# Patient Record
Sex: MALE | Race: ASIAN | NOT HISPANIC OR LATINO | URBAN - METROPOLITAN AREA
[De-identification: names, ages, dates, MRNs, and addresses within clinical notes are randomized per-mention and may not be internally consistent; named-entity substitution may affect disease eponyms.]

---

## 2022-07-27 ENCOUNTER — INPATIENT (INPATIENT)
Facility: HOSPITAL | Age: 32
LOS: 4 days | Discharge: HOME | End: 2022-08-01
Attending: STUDENT IN AN ORGANIZED HEALTH CARE EDUCATION/TRAINING PROGRAM | Admitting: STUDENT IN AN ORGANIZED HEALTH CARE EDUCATION/TRAINING PROGRAM

## 2022-07-27 VITALS
RESPIRATION RATE: 18 BRPM | SYSTOLIC BLOOD PRESSURE: 125 MMHG | TEMPERATURE: 99 F | DIASTOLIC BLOOD PRESSURE: 75 MMHG | HEART RATE: 80 BPM | OXYGEN SATURATION: 99 %

## 2022-07-27 DIAGNOSIS — R05.9 COUGH, UNSPECIFIED: ICD-10-CM

## 2022-07-27 DIAGNOSIS — Z29.9 ENCOUNTER FOR PROPHYLACTIC MEASURES, UNSPECIFIED: ICD-10-CM

## 2022-07-27 DIAGNOSIS — L03.115 CELLULITIS OF RIGHT LOWER LIMB: ICD-10-CM

## 2022-07-27 DIAGNOSIS — L03.90 CELLULITIS, UNSPECIFIED: ICD-10-CM

## 2022-07-27 DIAGNOSIS — E87.1 HYPO-OSMOLALITY AND HYPONATREMIA: ICD-10-CM

## 2022-07-27 LAB
ALBUMIN SERPL ELPH-MCNC: 4 G/DL — SIGNIFICANT CHANGE UP (ref 3.3–5)
ALP SERPL-CCNC: 88 U/L — SIGNIFICANT CHANGE UP (ref 40–120)
ALT FLD-CCNC: 37 U/L — SIGNIFICANT CHANGE UP (ref 4–41)
ANION GAP SERPL CALC-SCNC: 17 MMOL/L — HIGH (ref 7–14)
AST SERPL-CCNC: 27 U/L — SIGNIFICANT CHANGE UP (ref 4–40)
B PERT DNA SPEC QL NAA+PROBE: SIGNIFICANT CHANGE UP
B PERT+PARAPERT DNA PNL SPEC NAA+PROBE: SIGNIFICANT CHANGE UP
BASOPHILS # BLD AUTO: 0.07 K/UL — SIGNIFICANT CHANGE UP (ref 0–0.2)
BASOPHILS NFR BLD AUTO: 0.4 % — SIGNIFICANT CHANGE UP (ref 0–2)
BILIRUB SERPL-MCNC: 0.4 MG/DL — SIGNIFICANT CHANGE UP (ref 0.2–1.2)
BLOOD GAS VENOUS COMPREHENSIVE RESULT: SIGNIFICANT CHANGE UP
BORDETELLA PARAPERTUSSIS (RAPRVP): SIGNIFICANT CHANGE UP
BUN SERPL-MCNC: 18 MG/DL — SIGNIFICANT CHANGE UP (ref 7–23)
C PNEUM DNA SPEC QL NAA+PROBE: SIGNIFICANT CHANGE UP
CALCIUM SERPL-MCNC: 9.5 MG/DL — SIGNIFICANT CHANGE UP (ref 8.4–10.5)
CHLORIDE SERPL-SCNC: 97 MMOL/L — LOW (ref 98–107)
CO2 SERPL-SCNC: 19 MMOL/L — LOW (ref 22–31)
CREAT SERPL-MCNC: 0.55 MG/DL — SIGNIFICANT CHANGE UP (ref 0.5–1.3)
EGFR: 137 ML/MIN/1.73M2 — SIGNIFICANT CHANGE UP
EOSINOPHIL # BLD AUTO: 0.22 K/UL — SIGNIFICANT CHANGE UP (ref 0–0.5)
EOSINOPHIL NFR BLD AUTO: 1.3 % — SIGNIFICANT CHANGE UP (ref 0–6)
FLUAV SUBTYP SPEC NAA+PROBE: SIGNIFICANT CHANGE UP
FLUBV RNA SPEC QL NAA+PROBE: SIGNIFICANT CHANGE UP
GLUCOSE SERPL-MCNC: 97 MG/DL — SIGNIFICANT CHANGE UP (ref 70–99)
HADV DNA SPEC QL NAA+PROBE: SIGNIFICANT CHANGE UP
HCOV 229E RNA SPEC QL NAA+PROBE: SIGNIFICANT CHANGE UP
HCOV HKU1 RNA SPEC QL NAA+PROBE: SIGNIFICANT CHANGE UP
HCOV NL63 RNA SPEC QL NAA+PROBE: SIGNIFICANT CHANGE UP
HCOV OC43 RNA SPEC QL NAA+PROBE: SIGNIFICANT CHANGE UP
HCT VFR BLD CALC: 41 % — SIGNIFICANT CHANGE UP (ref 39–50)
HGB BLD-MCNC: 13.3 G/DL — SIGNIFICANT CHANGE UP (ref 13–17)
HIV 1+2 AB+HIV1 P24 AG SERPL QL IA: SIGNIFICANT CHANGE UP
HMPV RNA SPEC QL NAA+PROBE: SIGNIFICANT CHANGE UP
HPIV1 RNA SPEC QL NAA+PROBE: SIGNIFICANT CHANGE UP
HPIV2 RNA SPEC QL NAA+PROBE: SIGNIFICANT CHANGE UP
HPIV3 RNA SPEC QL NAA+PROBE: SIGNIFICANT CHANGE UP
HPIV4 RNA SPEC QL NAA+PROBE: DETECTED
IANC: 12.12 K/UL — HIGH (ref 1.8–7.4)
IMM GRANULOCYTES NFR BLD AUTO: 0.5 % — SIGNIFICANT CHANGE UP (ref 0–1.5)
LYMPHOCYTES # BLD AUTO: 19.1 % — SIGNIFICANT CHANGE UP (ref 13–44)
LYMPHOCYTES # BLD AUTO: 3.24 K/UL — SIGNIFICANT CHANGE UP (ref 1–3.3)
M PNEUMO DNA SPEC QL NAA+PROBE: SIGNIFICANT CHANGE UP
MCHC RBC-ENTMCNC: 28.9 PG — SIGNIFICANT CHANGE UP (ref 27–34)
MCHC RBC-ENTMCNC: 32.4 GM/DL — SIGNIFICANT CHANGE UP (ref 32–36)
MCV RBC AUTO: 89.1 FL — SIGNIFICANT CHANGE UP (ref 80–100)
MONOCYTES # BLD AUTO: 1.18 K/UL — HIGH (ref 0–0.9)
MONOCYTES NFR BLD AUTO: 7 % — SIGNIFICANT CHANGE UP (ref 2–14)
NEUTROPHILS # BLD AUTO: 12.12 K/UL — HIGH (ref 1.8–7.4)
NEUTROPHILS NFR BLD AUTO: 71.7 % — SIGNIFICANT CHANGE UP (ref 43–77)
NRBC # BLD: 0 /100 WBCS — SIGNIFICANT CHANGE UP
NRBC # FLD: 0 K/UL — SIGNIFICANT CHANGE UP
PLATELET # BLD AUTO: 427 K/UL — HIGH (ref 150–400)
POTASSIUM SERPL-MCNC: 4.1 MMOL/L — SIGNIFICANT CHANGE UP (ref 3.5–5.3)
POTASSIUM SERPL-SCNC: 4.1 MMOL/L — SIGNIFICANT CHANGE UP (ref 3.5–5.3)
PROT SERPL-MCNC: 8.4 G/DL — HIGH (ref 6–8.3)
RAPID RVP RESULT: DETECTED
RBC # BLD: 4.6 M/UL — SIGNIFICANT CHANGE UP (ref 4.2–5.8)
RBC # FLD: 12.8 % — SIGNIFICANT CHANGE UP (ref 10.3–14.5)
RSV RNA SPEC QL NAA+PROBE: SIGNIFICANT CHANGE UP
RV+EV RNA SPEC QL NAA+PROBE: SIGNIFICANT CHANGE UP
SARS-COV-2 RNA SPEC QL NAA+PROBE: SIGNIFICANT CHANGE UP
SODIUM SERPL-SCNC: 133 MMOL/L — LOW (ref 135–145)
WBC # BLD: 16.92 K/UL — HIGH (ref 3.8–10.5)
WBC # FLD AUTO: 16.92 K/UL — HIGH (ref 3.8–10.5)

## 2022-07-27 PROCEDURE — 88305 TISSUE EXAM BY PATHOLOGIST: CPT | Mod: 26

## 2022-07-27 PROCEDURE — 99053 MED SERV 10PM-8AM 24 HR FAC: CPT

## 2022-07-27 PROCEDURE — 99223 1ST HOSP IP/OBS HIGH 75: CPT

## 2022-07-27 PROCEDURE — 99255 IP/OBS CONSLTJ NEW/EST HI 80: CPT

## 2022-07-27 PROCEDURE — 71045 X-RAY EXAM CHEST 1 VIEW: CPT | Mod: 26

## 2022-07-27 PROCEDURE — 88312 SPECIAL STAINS GROUP 1: CPT | Mod: 26

## 2022-07-27 PROCEDURE — 99285 EMERGENCY DEPT VISIT HI MDM: CPT

## 2022-07-27 RX ORDER — VANCOMYCIN HCL 1 G
750 VIAL (EA) INTRAVENOUS EVERY 12 HOURS
Refills: 0 | Status: DISCONTINUED | OUTPATIENT
Start: 2022-07-27 | End: 2022-07-27

## 2022-07-27 RX ORDER — ENOXAPARIN SODIUM 100 MG/ML
40 INJECTION SUBCUTANEOUS EVERY 24 HOURS
Refills: 0 | Status: DISCONTINUED | OUTPATIENT
Start: 2022-07-27 | End: 2022-08-01

## 2022-07-27 RX ORDER — SODIUM CHLORIDE 9 MG/ML
1000 INJECTION INTRAMUSCULAR; INTRAVENOUS; SUBCUTANEOUS
Refills: 0 | Status: DISCONTINUED | OUTPATIENT
Start: 2022-07-27 | End: 2022-07-30

## 2022-07-27 RX ORDER — LIDOCAINE 4 G/100G
1 CREAM TOPICAL ONCE
Refills: 0 | Status: COMPLETED | OUTPATIENT
Start: 2022-07-27 | End: 2022-07-27

## 2022-07-27 RX ORDER — ACETAMINOPHEN 500 MG
650 TABLET ORAL EVERY 6 HOURS
Refills: 0 | Status: DISCONTINUED | OUTPATIENT
Start: 2022-07-27 | End: 2022-08-01

## 2022-07-27 RX ADMIN — SODIUM CHLORIDE 100 MILLILITER(S): 9 INJECTION INTRAMUSCULAR; INTRAVENOUS; SUBCUTANEOUS at 11:55

## 2022-07-27 RX ADMIN — LIDOCAINE 1 APPLICATION(S): 4 CREAM TOPICAL at 05:40

## 2022-07-27 RX ADMIN — Medication 100 MILLIGRAM(S): at 05:40

## 2022-07-27 RX ADMIN — Medication 100 MILLIGRAM(S): at 19:37

## 2022-07-27 NOTE — ED PROVIDER NOTE - CLINICAL SUMMARY MEDICAL DECISION MAKING FREE TEXT BOX
33 Y/O M denies PMH or PSH currently undomiciled C/O painful rash to the right arm and both ankles for the past month. Pt denies specific trigger. Pt also states he has been coughing for the past month. Plan is CXR and quantiferion to eval for TB, labs to eval for anemia or electrolyte disturbance. Pt is undomiciled and would be unable to  any prescription and has no ability to follow up.

## 2022-07-27 NOTE — ED ADULT TRIAGE NOTE - CHIEF COMPLAINT QUOTE
rash- r/o monkeypox    pt is undomiciled.. has a rash to lower legs, feet, arms for 1 month.  denies drugs or etoh rash- r/o monkeypox    pt is undomiciled.. has a rash to lower legs, feet, arms for 1 month.  has cough.  denies drugs or etoh

## 2022-07-27 NOTE — H&P ADULT - HISTORY OF PRESENT ILLNESS
Patient is a 33 y/o M with no PMH/PSH, currently undomiciled presenting to the ED with rash that has been present for approximately 1 month. Patient reports that he noted small itchy "bites" on his R arm and b/l ankles about 1 month ago and states that these lesions did not improve and slowly grew over time. He is concerned that they aren't healing and came in to the hospital to be evaluated. He also endorses a cough that he has had for several days but denies any sputum production or fever/runny nose. He denies any other infectious symptoms such as dysuria, diarrhea or sore throat. He denies any dysphagia or odynophagia. Denies any history of STDs. Patient denies any recent travel and states that he has always lived in [place]. Denies any sick contacts. Patient denies any recent sexual contact but refuses to elaborate much further.    In the ED patient was given 1 dose of PO doxycycline. Patient is a 31 y/o M with no PMH/PSH, currently undomiciled presenting to the ED with rash that has been present for approximately 1 month. Patient reports that he noted small itchy "bites" on his R arm and b/l ankles about 1 month ago and states that these lesions did not improve and slowly grew over time. He also notes that he was able to express some pus and fluid from these lesions. He is concerned that they aren't healing and came in to the hospital to be evaluated. He also endorses a cough that he has had for several days but denies any sputum production or fever/runny nose. Patient denies any genital lesions. He denies any other infectious symptoms such as dysuria, diarrhea or sore throat. He denies any dysphagia or odynophagia. Denies any history of STDs. Patient denies any recent travel and states that he has always lived in [place]. Denies any sick contacts. Patient denies any recent sexual contact but refuses to elaborate much further.    In the ED patient was given 1 dose of PO doxycycline.

## 2022-07-27 NOTE — CONSULT NOTE ADULT - SUBJECTIVE AND OBJECTIVE BOX
HPI: Patient is a 33 y/o M with no PMH/PSH, currently undomiciled presenting to the ED with rash that has been present for approximately 1 month. Patient reports that he noted small itchy "bites" on his R arm and b/l ankles about 1 month ago and states that these lesions did not improve and slowly grew over time. He also notes that he was able to express some pus and fluid from these lesions. He is concerned that they aren't healing and came in to the hospital to be evaluated. He also endorses a cough that he has had for several days but denies any sputum production or fever/runny nose. Patient denies any genital lesions. He denies any other infectious symptoms such as dysuria, diarrhea or sore throat. He denies any dysphagia or odynophagia. Denies any history of STDs. Patient denies any recent travel and states that he has always lived in [place]. Denies any sick contacts. Patient denies any recent sexual contact but refuses to elaborate much further.    In the ED patient was given 1 dose of PO doxycycline. (27 Jul 2022 09:57)      PAST MEDICAL & SURGICAL HISTORY:  No pertinent past medical history  No significant past surgical history    REVIEW OF SYSTEMS:  General: no fevers/chills; no lethargy  Skin/Breast: see HPI  Ophthalmologic: no eye pain or changes in vision  ENT: no dysphagia or changes in hearing  Respiratory: cough+ no SOB  Cardiovascular: no palpitations or chest pain  Gastrointestinal: no abdominal pain or blood in stool  Genitourinary: no dysuria or frequency  Musculoskeletal: no joint pains or weakness  Neurological: no weakness, numbness, or tingling    MEDICATIONS  (STANDING):  doxycycline monohydrate Capsule 100 milliGRAM(s) Oral every 12 hours  enoxaparin Injectable 40 milliGRAM(s) SubCutaneous every 24 hours  sodium chloride 0.9%. 1000 milliLiter(s) (100 mL/Hr) IV Continuous <Continuous>    MEDICATIONS  (PRN):  acetaminophen     Tablet .. 650 milliGRAM(s) Oral every 6 hours PRN Temp greater or equal to 38C (100.4F), Mild Pain (1 - 3)    Allergies: No Known Allergies    SOCIAL HISTORY:   Denies tobacco use  Denies illicit drug use  Denies EtOH use  Reports has been living "on the street" for several years now. States he is originally from [place] and [place] in the [place].  Denies any current employment. Declining to answer any questions about sexual contacts.    FAMILY HISTORY: No pertinent first degree FH.    Vital Signs Last 24 Hrs  T(C): 36.6 (27 Jul 2022 12:14), Max: 37.3 (27 Jul 2022 03:15)  T(F): 97.9 (27 Jul 2022 12:14), Max: 99.1 (27 Jul 2022 03:15)  HR: 84 (27 Jul 2022 12:53) (73 - 84)  BP: 109/75 (27 Jul 2022 12:53) (103/55 - 125/75)  BP(mean): --  RR: 17 (27 Jul 2022 12:53) (16 - 18)  SpO2: 99% (27 Jul 2022 12:53) (99% - 100%)    Parameters below as of 27 Jul 2022 12:53  Patient On (Oxygen Delivery Method): room air    PHYSICAL EXAM:  The patient was AOx3, well nourished, and in NAD.  OP showed no ulcerations.  There was no visible LAD.  Conjunctiva were non-injected.  There was no clubbing or edema of extremities.  The scalp, hair, face, eyebrows, lips, OP, neck, chest, back, extremities x4, and nails were examined.  There was no hyperhidrosis or bromhidrosis.    Of note on skin exam:    LABS:                        13.3   16.92 )-----------( 427      ( 27 Jul 2022 05:40 )             41.0     07-27    133<L>  |  97<L>  |  18  ----------------------------<  97  4.1   |  19<L>  |  0.55    Ca    9.5      27 Jul 2022 05:40    TPro  8.4<H>  /  Alb  4.0  /  TBili  0.4  /  DBili  x   /  AST  27  /  ALT  37  /  AlkPhos  88  07-27      RADIOLOGY & ADDITIONAL STUDIES:  ACC: [id number] EXAM:  XR CHEST PORTABLE URGENT 1V                        PROCEDURE DATE:  07/27/2022    IMPRESSION:  No acute pulmonary disease. HPI: Patient is a 33 y/o M with no PMH/PSH, currently undomiciled presenting to the ED with rash that has been present for approximately 1 month. Patient reports that he noted small itchy "bites" on his R arm and b/l ankles about 1 month ago and states that these lesions did not improve and slowly grew over time. He also notes that he was able to express some pus and fluid from these lesions. He is concerned that they aren't healing and came in to the hospital to be evaluated. He also endorses a cough that he has had for several days but denies any sputum production or fever/runny nose. Patient denies any genital lesions. He denies any other infectious symptoms such as dysuria, diarrhea or sore throat. He denies any dysphagia or odynophagia. Denies any history of STDs. Patient denies any recent travel and states that he has always lived in [place]. Denies any sick contacts. Patient denies any recent sexual contact but refuses to elaborate much further.    ED course: patient was given vancomycin and doxycycline 100mg q12h PO, ID stopped vancomycin. Pt was found to be parainfluenza+.    Derm hx: 31yo with no PMH undomiciled with 1 month long itchy rash on right arm and b/l ankles, that started after getting bug bites and then scratching while swimming in [facility]. Rash started as irritation lesions, then ulcerated and became purulent. ID has sent workup for: monkeypox (PCR), Bartonella, HIV, syphilis, RMSF, BCXs x 2    PAST MEDICAL & SURGICAL HISTORY:  No pertinent past medical history  No significant past surgical history    REVIEW OF SYSTEMS:  General: no fevers/chills; no lethargy  Skin/Breast: see HPI  Ophthalmologic: no eye pain or changes in vision  ENT: no dysphagia or changes in hearing  Respiratory: cough+ no SOB  Cardiovascular: no palpitations or chest pain  Gastrointestinal: no abdominal pain or blood in stool  Genitourinary: no dysuria or frequency  Musculoskeletal: no joint pains or weakness  Neurological: no weakness, numbness, or tingling    MEDICATIONS  (STANDING):  doxycycline monohydrate Capsule 100 milliGRAM(s) Oral every 12 hours  enoxaparin Injectable 40 milliGRAM(s) SubCutaneous every 24 hours  sodium chloride 0.9%. 1000 milliLiter(s) (100 mL/Hr) IV Continuous <Continuous>    MEDICATIONS  (PRN):  acetaminophen     Tablet .. 650 milliGRAM(s) Oral every 6 hours PRN Temp greater or equal to 38C (100.4F), Mild Pain (1 - 3)    Allergies: No Known Allergies    SOCIAL HISTORY:   Denies tobacco use  Denies illicit drug use  Denies EtOH use  Reports has been living "on the street" for several years now. States he is originally from [place] and has been in the [place] for [place].  Denies any current employment. Declining to answer any questions about sexual contacts.    FAMILY HISTORY: No pertinent first degree FH.    Vital Signs Last 24 Hrs  T(C): 36.6 (27 Jul 2022 12:14), Max: 37.3 (27 Jul 2022 03:15)  T(F): 97.9 (27 Jul 2022 12:14), Max: 99.1 (27 Jul 2022 03:15)  HR: 84 (27 Jul 2022 12:53) (73 - 84)  BP: 109/75 (27 Jul 2022 12:53) (103/55 - 125/75)  BP(mean): --  RR: 17 (27 Jul 2022 12:53) (16 - 18)  SpO2: 99% (27 Jul 2022 12:53) (99% - 100%)    Parameters below as of 27 Jul 2022 12:53  Patient On (Oxygen Delivery Method): room air    PHYSICAL EXAM:  The patient was AOx3, well nourished, and in NAD.  OP showed no ulcerations.  There was no visible LAD.  Conjunctiva were non-injected.  There was no clubbing or edema of extremities.  The scalp, hair, face, eyebrows, lips, OP, neck, chest, back, extremities x4, and nails were examined.  There was no hyperhidrosis or bromhidrosis.    Of note on skin exam:  - purulent erythematous nodule on right distal arm   - erythematous plaques and ulcers with hemorrhagic and yellow crust scattered on right distal arm and b/l ankles    LABS:                        13.3   16.92 )-----------( 427      ( 27 Jul 2022 05:40 )             41.0     07-27    133<L>  |  97<L>  |  18  ----------------------------<  97  4.1   |  19<L>  |  0.55    Ca    9.5      27 Jul 2022 05:40    TPro  8.4<H>  /  Alb  4.0  /  TBili  0.4  /  DBili  x   /  AST  27  /  ALT  37  /  AlkPhos  88  07-27      RADIOLOGY & ADDITIONAL STUDIES:  ACC: [id number] EXAM:  XR CHEST PORTABLE URGENT 1V                        PROCEDURE DATE:  07/27/2022    IMPRESSION:  No acute pulmonary disease.

## 2022-07-27 NOTE — PATIENT PROFILE ADULT - FALL HARM RISK - HARM RISK INTERVENTIONS
Assistance with ambulation/Assistance OOB with selected safe patient handling equipment/Communicate Risk of Fall with Harm to all staff/Discuss with provider need for PT consult/Monitor gait and stability/Reinforce activity limits and safety measures with patient and family/Tailored Fall Risk Interventions/Use of alarms - bed, chair and/or voice tab/Visual Cue: Yellow wristband and red socks/Bed in lowest position, wheels locked, appropriate side rails in place/Call bell, personal items and telephone in reach/Instruct patient to call for assistance before getting out of bed or chair/Non-slip footwear when patient is out of bed/[place] to call system/Physically safe environment - no spills, clutter or unnecessary equipment/Purposeful Proactive Rounding/Room/bathroom lighting operational, light cord in reach

## 2022-07-27 NOTE — H&P ADULT - NSHPPHYSICALEXAM_GEN_ALL_CORE
GENERAL: NAD, lying in bed comfortably  HEAD: Normocephalic, atraumatic  EYES: EOMI, PERRL  ENT: no lesions noted within oral mucosa, moist mucus membranes, small crusted lesion noted over left vermillion border  NECK: Supple, No JVD, no palpable lymphadenopathy  CHEST/LUNG: CTAB, no increased WOB  HEART: RRR, no m/r/g  ABDOMEN: soft, NT, ND, BS+  EXTREMITIES:  2+ peripheral pulses, no clubbing, no edema  NERVOUS SYSTEM:  A&Ox3, no focal deficits  MSK: FROM all 4 extremities, full and equal strength  SKIN: R arm with crusted over lesion (approximately 3cm) with scant purulent drainage, b/l ankles lesions noted, multiple crusted over lesions with smaller papules, lesions noted to be slightly tender to palpation GENERAL: NAD, lying in bed comfortably  HEAD: Normocephalic, atraumatic  EYES: EOMI, PERRL  ENT: no lesions noted within oral mucosa, moist mucus membranes, small crusted lesion noted over left vermillion border  NECK: Supple, No JVD, no palpable lymphadenopathy  CHEST/LUNG: CTAB, no increased WOB  HEART: RRR, no m/r/g  ABDOMEN: soft, NT, ND, BS+  /Rectal: Patient refusing  EXTREMITIES:  2+ peripheral pulses, no clubbing, no edema  NERVOUS SYSTEM:  A&Ox3, no focal deficits  MSK: FROM all 4 extremities, full and equal strength  SKIN: R arm with crusted over lesion (approximately 3cm) with scant purulent drainage, b/l ankles lesions noted, multiple crusted over lesions with smaller papules, lesions noted to be slightly tender to palpation

## 2022-07-27 NOTE — CONSULT NOTE ADULT - ASSESSMENT
33 yo M no prior PMH, homeless, MSW, initially with rash  Leukocytosis, no fevers  Rash starting one month ago on ankles, feet, lower arms  Rash started as irritation lesions, then ulcerated and became purulent  CXR clear  No typical risk factors for Monkeypox  Patient is homeless  Uncertain underlying etiology--consider rickettsial such as rickettsialpox vs monkey pox vs other bites/lesions related to insects (in setting of homelessness)  Overall,  1) Rash  - Unclear etiology, could be monkeypox but does not seem to have risk factors; consider insect related process in setting of homelessness  - Doxycyline 100mg q 12  - DC Vanco  - Check Bartonella, HIV, syphilis, RMSF, BCXs x 2  - F/U Monkeypox swab  - Would consult Dermatology  2) Leukocytosis  -  Trend to normal  - BCXs as above  3) Parainfluenza  - Supportive care    Zac Cast MD  Contact on TEAMS messaging from 9am - 5pm  From 5pm-9am, on weekends, or if no response call [phone]

## 2022-07-27 NOTE — ED PROVIDER NOTE - NS ED ATTENDING STATEMENT MOD
This was a shared visit with the VIJAY. I reviewed and verified the documentation and independently performed the documented:

## 2022-07-27 NOTE — PATIENT PROFILE ADULT - ARRIVAL FROM
Returned patients call unable to leave message, mailbox full for patient at      Telephone Information:   Mobile [phone]    to schedule Consult.    Unable to leave message mailbox full   Pt is homeless and came from the city.

## 2022-07-27 NOTE — CONSULT NOTE ADULT - ATTENDING COMMENTS
Pustular eruption noted on the extremities with a hx of recent fresh water exposure. Differential includes bacterial infection (staph, atypical mycobacterial infections, other) vs. monkeypox vs. other. Performed 2 biopsies, H&E and tissue cultures to assess for infection.    Adam Kramer MD, PharmD, MPH  Co-Director, Inpatient Dermatology Consultation Service, [facility]

## 2022-07-27 NOTE — H&P ADULT - PROBLEM SELECTOR PROBLEM 4
Need for prophylactic measure Plastic Surgeon Procedure Text (A): After obtaining clear surgical margins the patient was sent to plastics for surgical repair.  The patient understands they will receive post-surgical care and follow-up from the referring physician's office.

## 2022-07-27 NOTE — CONSULT NOTE ADULT - ASSESSMENT
ASSESSMENT/PLAN:      Recommendations were communicated with the primary team (Dr Mrace Cardoza)  The patient's chart was reviewed in addition to being examined at bedside with the dermatology attending. Discussed plans with the dermatology attending, Dr. Kramer  Dermatology will continue to follow. Thank you for consulting our service.    Vickie John MD MPH  Resident Physician, PGY3  [facility] Dermatology  Office: [phone]  Pager: [phone]  **Please page with 10-DIGIT callback # for further related questions.** 31yo with no PMH undomiciled with 1 month long itchy rash on right arm and b/l ankles, that started after getting bug bites and then scratching while swimming in [facility]    ASSESSMENT/PLAN:  #Favor infectious process. Given hx of swimming in body of water, favor mycobacterial infection. 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 It is found in both fresh- and saltwater environments. Minor trauma is a predisposing factor. Other infections such as sporotrichosis or leischmaniasis are less likely due to lack of exposure and travel.  - Appreciate ID work-up and recommendations  - Follow bacterial culture taken by dermatology team of left calf on 07/27/22  - Biopsy performed on left leg on 07/27/22 for H&E x 1 and Tissue Culture x 1. Procedure as below    ---  Dermatology Punch Biopsy Procedure Note    After risks and benefits of procedure including bleeding, infection and scar were reviewed (consents including photo consent reviewed, signed and in chart), allergies were reviewed and time out performed. Written consent given by the patient.    Area cleaned with rubbing alcohol and anesthetized with lidocaine and epinephrine.  #2, 4mm punch biopsies were performed to left leg, hemostasis achieved with surgifoarm and pressure dressing placed. Wound care reviewed with patient and team. Biopsy site should remain covered with pressure bandage for 24-48 hours. Then apply Vaseline to biopsy site daily and cover with bandage until healed.  ---    Recommendations were communicated with the primary team (Dr Marce Cardoza)  The patient's chart was reviewed in addition to being examined at bedside with the dermatology attending. Discussed plans with the dermatology attending, Dr. Kramer  Dermatology will continue to follow. Thank you for consulting our service.    Vickie John MD MPH  Resident Physician, PGY3  [facility] Dermatology  Office: [phone]  Pager: [phone]  **Please page with 10-DIGIT callback # for further related questions.**

## 2022-07-27 NOTE — ED ADULT NURSE REASSESSMENT NOTE - NS ED NURSE REASSESS COMMENT FT1
Pt a&ox4, NAD, Respirations are even and unlabored, no signs of respiratory distress.
Received pt at change of shift, PT is resting in stretcher, easily arousable to verbal stimuli, A+Ox4. pt arrives for rash to his right wrist, and bilateral lower extremities. redness, swelling and purulent drainage noted to areas. no other medical complaints at this time. Respirations even and unlabored, normal work of breathing, no accessory muscle use, speaking in full clear uninterrupted sentences. Pt denies any chest pain, SOB, headache, dizziness, N/V/D, fever, chills. 22G to Left forearm, no redness or swelling noted. will continue to monitor.

## 2022-07-27 NOTE — H&P ADULT - PROBLEM SELECTOR PLAN 2
Patient with nonproductive cough for several days  No crackles or stridor on lung exam  CXR without any focal consolidations  Quantiferon gold sent, currently pending  RVP Positive for Parainfluenza  C/W supportive care and symptom control

## 2022-07-27 NOTE — ED PROVIDER NOTE - PRINCIPAL DIAGNOSIS
Erick Schafer is calling to request a refill on the following medication(s):    Medication Request:  Requested Prescriptions     Pending Prescriptions Disp Refills    ondansetron (ZOFRAN) 4 MG tablet 20 tablet 3     Sig: TAKE ONE TABLET BY MOUTH EVERY 6 TO 8 HOURS AS NEEDED FOR NAUSEA AND VOMITING    meclizine (ANTIVERT) 25 MG tablet 90 tablet 0     Sig: Take 1 tablet by mouth 3 times daily as needed for Dizziness       Last Visit Date (If Applicable):  0/70/8340    Next Visit Date:    12/15/2021
Cellulitis of right leg

## 2022-07-27 NOTE — ED PROVIDER NOTE - PHYSICAL EXAMINATION
Ulceration to R ankle, L ankle and R hand. R ankle with surrounding erythema and discharge, no crepitus.

## 2022-07-27 NOTE — H&P ADULT - NSHPSOCIALHISTORY_GEN_ALL_CORE
Denies tobacco use  Denies illicit drug use  Denies EtOH use  Reports has been living "on the street" for several years now. States he is originally from [facility] and [place] in the [place].  Denies any current employment. Declining to answer any questions about sexual contacts.

## 2022-07-27 NOTE — H&P ADULT - PROBLEM SELECTOR PLAN 3
Patient noted with hyponatremia to 133  Also with slightly elevated lactate to 2.1  Most likely hypovolemic hyponatremia in setting of ongoing infection  Will give gentle IV hydration, NS @ 100cc/hr for 10 hours  Encourage PO intake

## 2022-07-27 NOTE — PATIENT PROFILE ADULT - FUNCTIONAL ASSESSMENT - BASIC MOBILITY 6.
3-calculated by average/Not able to assess (calculate score using [facility] averaging method)

## 2022-07-27 NOTE — ED ADULT NURSE NOTE - OBJECTIVE STATEMENT
33 y/o male, a&ox4, received to rm 20 for rash. Pt reports rash located to the right wrist, and left lower extremity for the past month. Endorses itchiness, skin abrasions and scabs noted to extremities. Pt also endorses a cough, pt is currently homeless and lives in a shelter. Denies PMH. 22G IV placed to left AC labs collected and sent off. Pt medicated as per MD orders. Respirations are even and unlabored, no signs of respiratory distress.

## 2022-07-27 NOTE — CONSULT NOTE ADULT - SUBJECTIVE AND OBJECTIVE BOX
"HPI:  Patient is a 33 y/o M with no PMH/PSH, currently undomiciled presenting to the ED with rash that has been present for approximately 1 month. Patient reports that he noted small itchy "bites" on his R arm and b/l ankles about 1 month ago and states that these lesions did not improve and slowly grew over time. He also notes that he was able to express some pus and fluid from these lesions. He is concerned that they aren't healing and came in to the hospital to be evaluated. He also endorses a cough that he has had for several days but denies any sputum production or fever/runny nose. Patient denies any genital lesions. He denies any other infectious symptoms such as dysuria, diarrhea or sore throat. He denies any dysphagia or odynophagia. Denies any history of STDs. Patient denies any recent travel and states that he has always lived in [place]. Denies any sick contacts. Patient denies any recent sexual contact but refuses to elaborate much further.    In the ED patient was given 1 dose of PO doxycycline. (27 Jul 2022 09:57)"    Above reviewed. 31 yo M no prior PMH, homeless, MSW, initially with rash  Patient states starting one month ago developed rash around feet and ankles, as well as lower arm  Increasing in size, and ulcerating  States was swimming in [facility] and then lesions started  No sexual contact recently, never MSM  No other travel, originally from [facility]  ID called for further eval    PAST MEDICAL & SURGICAL HISTORY:  No pertinent past medical history      No significant past surgical history    Allergies    No Known Allergies    Intolerances    ANTIMICROBIALS:  vancomycin  IVPB 750 every 12 hours    OTHER MEDS:  acetaminophen     Tablet .. 650 milliGRAM(s) Oral every 6 hours PRN  enoxaparin Injectable 40 milliGRAM(s) SubCutaneous every 24 hours  sodium chloride 0.9%. 1000 milliLiter(s) IV Continuous <Continuous>    SOCIAL HISTORY: No tobacco, no alcohol, no illicit drugs    FAMILY HISTORY: No pertinent family history relating to chief complaint    Drug Dosing Weight    Weight (kg): 75.2 (27 Jul 2022 10:46)    PE:    Vital Signs Last 24 Hrs  T(C): 36.8 (27 Jul 2022 08:39), Max: 37.3 (27 Jul 2022 03:15)  T(F): 98.2 (27 Jul 2022 08:39), Max: 99.1 (27 Jul 2022 03:15)  HR: 73 (27 Jul 2022 08:39) (73 - 80)  BP: 103/55 (27 Jul 2022 08:39) (103/55 - 125/75)  RR: 18 (27 Jul 2022 08:39) (16 - 18)  SpO2: 100% (27 Jul 2022 08:39) (99% - 100%)    Gen: AOx3, NAD, non-toxic, pleasant  CV: S1+S2 normal, nontachycardic  Resp: Clear bilat, no resp distress, no crackles/wheezes  Abd: Soft, nontender, +BS  Ext: No LE edema, no wounds  : No Raymnudo  IV/Skin: No thrombophlebitis  Msk: No low back pain, no arthralgias, no joint swelling  Neuro: No sensory deficits, no motor deficits    LABS:                        13.3   16.92 )-----------( 427      ( 27 Jul 2022 05:40 )             41.0     07-27    133<L>  |  97<L>  |  18  ----------------------------<  97  4.1   |  19<L>  |  0.55    Ca    9.5      27 Jul 2022 05:40    TPro  8.4<H>  /  Alb  4.0  /  TBili  0.4  /  DBili  x   /  AST  27  /  ALT  37  /  AlkPhos  88  07-27    MICROBIOLOGY:      Rapid RVP Result: Detected (07-27 @ 07:10) Parainfluenza    RADIOLOGY:    CXR 7/27:    FINDINGS:  Both lungs are equally aerated and free of any focal abnormalities. The   heart is not enlarged and there is no effusion.        COMPARISON:  None Available        IMPRESSION:  No acute pulmonary disease.

## 2022-07-27 NOTE — ED PROVIDER NOTE - OBJECTIVE STATEMENT
31 Y/O M denies PMH or PSH currently undomiciled C/O painful rash to the right arm and both ankles for the past month. Pt denies specific trigger. Pt also states he has been coughing for the past month. Pt denies SOB, denies any other sx or acute complaints.

## 2022-07-27 NOTE — H&P ADULT - ASSESSMENT
31 y/o M with no PMH/PSH, currently undomiciled currently presenting with complaint of rash for approximately 1 month. Etiology of rash unclear, however likely cellulitis vs possible monkeypox with evidence of systemic infection.

## 2022-07-27 NOTE — ED PROVIDER NOTE - ATTENDING APP SHARED VISIT CONTRIBUTION OF CARE
I performed a face-to-face evaluation of the patient and performed a history and physical examination along with the resident or ACP, and/or medical student above.  I agree with the history and physical examination as documented by the resident or ACP, and/or medical student above.  Gracia:   33yo M, denies pmh/PSxh, undomiciled, c/o painful rash to right arm and b/l ankles x approx 1 month. On ROS, also reporting cough but denies sob or cp. Denies f/c. Social issues as well to prevent pt from filling prescription/taking abx for cellulitis. Labs, imaging, meds, tba.

## 2022-07-27 NOTE — H&P ADULT - NSHPLABSRESULTS_GEN_ALL_CORE
LABS:                         13.3   16.92 )-----------( 427      ( 27 Jul 2022 05:40 )             41.0     07-27    133<L>  |  97<L>  |  18  ----------------------------<  97  4.1   |  19<L>  |  0.55    Ca    9.5      27 Jul 2022 05:40    TPro  8.4<H>  /  Alb  4.0  /  TBili  0.4  /  DBili  x   /  AST  27  /  ALT  37  /  AlkPhos  88  07-27    RADIOLOGY, EKG & ADDITIONAL TESTS: Reviewed.

## 2022-07-27 NOTE — ED ADULT NURSE NOTE - CHIEF COMPLAINT QUOTE
rash- r/o monkeypox    pt is undomiciled.. has a rash to lower legs, feet, arms for 1 month.  has cough.  denies drugs or etoh

## 2022-07-27 NOTE — H&P ADULT - PROBLEM SELECTOR PLAN 1
Patient with multiple crusted skin lesions noted with purulence and tenderness  Also with elevated WBC count, however without any fevers or swelling  Patient without recent exposure to healthcare settings and does not have any crepitus  Will treat empirically with cefazolin  Possible concern for monkeypox due to lesion distribution. Patient also poor historian with unclear sexual history and refusing genital exam  c/w droplet/contact precautions  ID consult called, f/u recs  Cont to trend CBCs Patient with multiple crusted skin lesions noted with purulence and tenderness  Also with elevated WBC count, however without any fevers or swelling  Patient without recent exposure to healthcare settings and does not have any crepitus  However patient reported history of pus drainage and some purulence noted on exam  Will treat empirically with vancomycin to cover for possible MRSA  Possible concern for monkeypox due to lesion distribution. Patient also poor historian with unclear sexual history and refusing genital exam  c/w droplet/contact precautions  ID consult called, f/u recs  Cont to trend CBCs

## 2022-07-28 LAB
ANION GAP SERPL CALC-SCNC: 14 MMOL/L — SIGNIFICANT CHANGE UP (ref 7–14)
BUN SERPL-MCNC: 11 MG/DL — SIGNIFICANT CHANGE UP (ref 7–23)
CALCIUM SERPL-MCNC: 9.5 MG/DL — SIGNIFICANT CHANGE UP (ref 8.4–10.5)
CHLORIDE SERPL-SCNC: 98 MMOL/L — SIGNIFICANT CHANGE UP (ref 98–107)
CO2 SERPL-SCNC: 25 MMOL/L — SIGNIFICANT CHANGE UP (ref 22–31)
CREAT SERPL-MCNC: 0.62 MG/DL — SIGNIFICANT CHANGE UP (ref 0.5–1.3)
EGFR: 132 ML/MIN/1.73M2 — SIGNIFICANT CHANGE UP
GLUCOSE SERPL-MCNC: 140 MG/DL — HIGH (ref 70–99)
GRAM STN FLD: SIGNIFICANT CHANGE UP
HCT VFR BLD CALC: 38 % — LOW (ref 39–50)
HGB BLD-MCNC: 13.1 G/DL — SIGNIFICANT CHANGE UP (ref 13–17)
MAGNESIUM SERPL-MCNC: 2.1 MG/DL — SIGNIFICANT CHANGE UP (ref 1.6–2.6)
MCHC RBC-ENTMCNC: 29.6 PG — SIGNIFICANT CHANGE UP (ref 27–34)
MCHC RBC-ENTMCNC: 34.5 GM/DL — SIGNIFICANT CHANGE UP (ref 32–36)
MCV RBC AUTO: 85.8 FL — SIGNIFICANT CHANGE UP (ref 80–100)
NIGHT BLUE STAIN TISS: SIGNIFICANT CHANGE UP
NRBC # BLD: 0 /100 WBCS — SIGNIFICANT CHANGE UP
NRBC # FLD: 0 K/UL — SIGNIFICANT CHANGE UP
PHOSPHATE SERPL-MCNC: 3.7 MG/DL — SIGNIFICANT CHANGE UP (ref 2.5–4.5)
PLATELET # BLD AUTO: 448 K/UL — HIGH (ref 150–400)
POTASSIUM SERPL-MCNC: 3.3 MMOL/L — LOW (ref 3.5–5.3)
POTASSIUM SERPL-SCNC: 3.3 MMOL/L — LOW (ref 3.5–5.3)
RBC # BLD: 4.43 M/UL — SIGNIFICANT CHANGE UP (ref 4.2–5.8)
RBC # FLD: 12.9 % — SIGNIFICANT CHANGE UP (ref 10.3–14.5)
SODIUM SERPL-SCNC: 137 MMOL/L — SIGNIFICANT CHANGE UP (ref 135–145)
SPECIMEN SOURCE: SIGNIFICANT CHANGE UP
SPECIMEN SOURCE: SIGNIFICANT CHANGE UP
T PALLIDUM AB TITR SER: NEGATIVE — SIGNIFICANT CHANGE UP
WBC # BLD: 20.49 K/UL — HIGH (ref 3.8–10.5)
WBC # FLD AUTO: 20.49 K/UL — HIGH (ref 3.8–10.5)

## 2022-07-28 PROCEDURE — 99233 SBSQ HOSP IP/OBS HIGH 50: CPT

## 2022-07-28 PROCEDURE — 99232 SBSQ HOSP IP/OBS MODERATE 35: CPT

## 2022-07-28 RX ADMIN — Medication 100 MILLIGRAM(S): at 05:33

## 2022-07-28 RX ADMIN — Medication 100 MILLIGRAM(S): at 18:25

## 2022-07-28 NOTE — DISCHARGE NOTE PROVIDER - HOSPITAL COURSE
33 y/o M with no PMH/PSH, currently undomiciled currently presenting with complaint of rash for approximately 1 month. Etiology of rash unclear, however likely cellulitis vs possible monkeypox with evidence of systemic infection. Patient with multiple crusted skin lesions noted with purulence and tenderness  Also with elevated WBC count, however without any fevers or swelling. Patient also poor historian with unclear sexual history and refusing genital exam  ID consulted trated with Doxy, derm s/p Punch Bx    Cough-RVP Positive for Parainfluenza, supportive care and symptom control.  Hyponatremia, Most likely hypovolemic hyponatremia in setting of ongoing infection, treated w/ IVF    on 7/28 pt is  medically stable for dc 33 y/o M with no PMH/PSH, currently undomiciled currently presenting with complaint of rash for approximately 1 month. Etiology of rash unclear, however likely cellulitis vs possible monkeypox with evidence of systemic infection. Patient with multiple crusted skin lesions noted with purulence and tenderness    multiple lesions on R and L leg  - Infectious Disease consulted, recommended cefazolin  - monkey pox and RMSF, bartonella, syphillis neg  - quant gold +, neg CXR, pt will need outpt follow up for treatement of latent TB   - Biopsy with strep pyogens  - will need outpt f/u for AFB cx of lesion as poss non MTB cause of lesion    Cough.   - Patient with nonproductive cough for several days--improving  - No crackles or stridor on lung exam  - Quantiferon gold +, CXR neg, will need outpt f/u for eval for latent TB treatment  - RVP Positive for Parainfluenza  - C/W supportive care and symptom control  - non toxic appearing.    Hyponatremia,   - Most likely hypovolemic hyponatremia in setting of ongoing infection, treated w/ IVF  - Pt to follow up with PCP as outpatient for monitoring     Need for prophylactic measure.   - DVT: Lovenox  - Diet: Regular       On_________, discussed with __________, patient is medically cleared and optimized for discharge today. All medications were reviewed with attending, and sent to mutually agreed upon pharmacy.   33 y/o M with no PMH/PSH, currently undomiciled currently presenting with complaint of rash for approximately 1 month. Etiology of rash unclear, however likely cellulitis vs possible monkeypox with evidence of systemic infection. Patient with multiple crusted skin lesions noted with purulence and tenderness    multiple lesions on R and L leg  - Infectious Disease consulted, recommended cefazolin  - monkey pox and RMSF, bartonella, syphillis neg  - quant gold +, neg CXR, pt will need outpt follow up for treatement of latent TB   - Biopsy with strep pyogens  - will need outpt f/u for AFB cx of lesion as poss non MTB cause of lesion  - Pt to be discharged on keflex     Cough.   - Patient with nonproductive cough for several days--improving  - No crackles or stridor on lung exam  - Quantiferon gold +, CXR neg, will need outpt f/u for eval for latent TB treatment  - RVP Positive for Parainfluenza  - C/W supportive care and symptom control  - non toxic appearing.    Hyponatremia,   - Most likely hypovolemic hyponatremia in setting of ongoing infection, treated w/ IVF  - Pt to follow up with PCP as outpatient for monitoring     Need for prophylactic measure.   - DVT: Lovenox  - Diet: Regular       On 8/1/2022, discussed with Dr. Galindo, patient is medically cleared and optimized for discharge today. All medications were reviewed with attending, and sent to mutually agreed upon pharmacy.   33 y/o M with no PMH/PSH, currently undomiciled currently presenting with complaint of rash for approximately 1 month. Etiology of rash unclear, however likely cellulitis vs possible monkeypox with evidence of systemic infection. Patient with multiple crusted skin lesions noted with purulence and tenderness    multiple lesions on R and L leg  - Infectious Disease consulted, recommended cefazolin  - monkey pox and RMSF, bartonella, syphillis neg  - quant gold +, neg CXR, pt will need outpt follow up for treatement of latent TB   - Biopsy with strep pyogens  - will need outpt f/u for AFB cx of lesion as poss non MTB cause of lesion  - Pt to be discharged on keflex x 10 days    Cough.   - Patient with nonproductive cough for several days--improving  - No crackles or stridor on lung exam  - Quantiferon gold +, CXR neg, will need outpt f/u for eval for latent TB treatment  - RVP Positive for Parainfluenza  - C/W supportive care and symptom control  - non toxic appearing.    Hyponatremia,   - Most likely hypovolemic hyponatremia in setting of ongoing infection, treated w/ IVF  - Pt to follow up with PCP as outpatient for monitoring     Need for prophylactic measure.   - DVT: Lovenox  - Diet: Regular       On 8/1/2022, discussed with Dr. Galindo, patient is medically cleared and optimized for discharge today. All medications were reviewed with attending, and sent to mutually agreed upon pharmacy.   31 y/o M with no PMH/PSH, currently undomiciled currently presenting with complaint of rash for approximately 1 month. Etiology of rash unclear, however likely cellulitis vs possible monkeypox with evidence of systemic infection. Patient with multiple crusted skin lesions noted with purulence and tenderness    multiple lesions on R and L leg  - Infectious Disease consulted, recommended cefazolin  - monkey pox and bartonella, syphillis neg. RMSF low level IgM +, likely false positive per ID  - quant gold +, neg CXR, pt will need outpt follow up for treatement of latent TB   - Biopsy with strep pyogens  - will need outpt f/u for AFB cx of lesion as poss non MTB cause of lesion  - Pt to be discharged on keflex x 10 days    Cough.   - Patient with nonproductive cough for several days--improving  - No crackles or stridor on lung exam  - Quantiferon gold +, CXR neg, will need outpt f/u for eval for latent TB treatment  - RVP Positive for Parainfluenza  - C/W supportive care and symptom control  - non toxic appearing.    Hyponatremia,   - Most likely hypovolemic hyponatremia in setting of ongoing infection, treated w/ IVF  - Pt to follow up with PCP as outpatient for monitoring     Need for prophylactic measure.   - DVT: Lovenox  - Diet: Regular       On 8/1/2022, discussed with Dr. Galindo, patient is medically cleared and optimized for discharge today. All medications were reviewed with attending, and sent to mutually agreed upon pharmacy.

## 2022-07-28 NOTE — DISCHARGE NOTE PROVIDER - EXTENDED VTE YES NO FOR MLM ASPIRIN
Type of surgery: Right shoulder CT guided reverse total shoulder arthroplasty   CPT 81014  Right rotator cuff tear arthropathy [M75.101, M12.811]  - Primary   Location of surgery: [facility]  Date and time of surgery: 01/28/2020 / 11:00am   Surgeon: Roe  Pre-Op Appt Date: 01/17/2020  Post-Op Appt Date: 02/10/2020   Packet sent out: Yes  Pre-cert/Authorization completed:  No prior auth needed for Medicare, call to [employer] [phone], I spoke to Sahil, he has started prior auth and asked that I fax clinicals at [phone]. With ref # [id number] and call ref # [id number]. I have faxed to them.   Date: 12/03/2019    Thank you,   Juju Montelongo   [facility] Department  [phone]     ,

## 2022-07-28 NOTE — DISCHARGE NOTE PROVIDER - PROVIDER TOKENS
PROVIDER:[TOKEN:[[id number]]] Terbinafine Counseling: Patient counseling regarding adverse effects of terbinafine including but not limited to headache, diarrhea, rash, upset stomach, liver function test abnormalities, itching, taste/smell disturbance, nausea, abdominal pain, and flatulence.  There is a rare possibility of liver failure that can occur when taking terbinafine.  The patient understands that a baseline LFT and kidney function test may be required. The patient verbalized understanding of the proper use and possible adverse effects of terbinafine.  All of the patient's questions and concerns were addressed.

## 2022-07-28 NOTE — DISCHARGE NOTE PROVIDER - NSDCMRMEDTOKEN_GEN_ALL_CORE_FT
doxycycline hyclate 100 mg oral capsule: 1 cap(s) orally 2 times a day    cephalexin 500 mg oral capsule: 1 cap(s) orally every 6 hours

## 2022-07-28 NOTE — DISCHARGE NOTE PROVIDER - NSDCFUADDAPPT_GEN_ALL_CORE_FT
Follow up with your primary care physician for further monitoring in 1-2 weeks. Please call to arrange appointment.   Follow up with your primary care physician for further monitoring in 1-2 weeks. Please call to arrange appointment.  Follow up with infectious disease for latent tuberculosis.

## 2022-07-28 NOTE — DISCHARGE NOTE PROVIDER - NSFOLLOWUPCLINICS_GEN_ALL_ED_FT
[facility] - Infectious Disease  Infectious Disease  [address], [place] [address]  Phone: [phone]  Fax:

## 2022-07-28 NOTE — DISCHARGE NOTE PROVIDER - NSDCCPCAREPLAN_GEN_ALL_CORE_FT
PRINCIPAL DISCHARGE DIAGNOSIS  Diagnosis: Cellulitis of right leg  Assessment and Plan of Treatment: You were admitted for skin rash. You were seen by infectious disease. You had a skin biopsy which was positive for strep. You were treated with antibiotics.      SECONDARY DISCHARGE DIAGNOSES  Diagnosis: Hyponatremia  Assessment and Plan of Treatment: Your sodium levels were found to be low. You are to follow up with PCP as outpatient for further monitoring of sodium levels.    Diagnosis: Cough  Assessment and Plan of Treatment: You were complaining of cough. You were found to be positive for parainfluenza, which is a respiratory virus. You were treated symptomatically.    Diagnosis: Latent tuberculosis  Assessment and Plan of Treatment: You were found to have latent tuberculosis. You will need to follow up with infectious disease for further treatment.     PRINCIPAL DISCHARGE DIAGNOSIS  Diagnosis: Cellulitis of right leg  Assessment and Plan of Treatment: You were admitted for skin rash. You were seen by infectious disease. You had a skin biopsy which was positive for strep. You were treated with antibiotics. Conitnue with antibiotics.      SECONDARY DISCHARGE DIAGNOSES  Diagnosis: Hyponatremia  Assessment and Plan of Treatment: Your sodium levels were found to be low. You are to follow up with PCP as outpatient for further monitoring of sodium levels.    Diagnosis: Cough  Assessment and Plan of Treatment: You were complaining of cough. You were found to be positive for parainfluenza, which is a respiratory virus. You were treated symptomatically.    Diagnosis: Latent tuberculosis  Assessment and Plan of Treatment: You were found to have latent tuberculosis. You will need to follow up with infectious disease for further treatment.     PRINCIPAL DISCHARGE DIAGNOSIS  Diagnosis: Cellulitis of right leg  Assessment and Plan of Treatment: You were admitted for skin rash. You were seen by infectious disease. You had a skin biopsy which was positive for strep. You were treated with antibiotics. Conitnue with antibiotics for 6 more days after discharge. Please follow-up with Infectious disease doctor for the biopsy of the skin wound.      SECONDARY DISCHARGE DIAGNOSES  Diagnosis: Cough  Assessment and Plan of Treatment: You were complaining of cough. You were found to be positive for parainfluenza, which is a respiratory virus. You were treated symptomatically.    Diagnosis: Hyponatremia  Assessment and Plan of Treatment: Your sodium levels were found to be low. You are to follow up with PCP as outpatient for further monitoring of sodium levels.    Diagnosis: Latent tuberculosis  Assessment and Plan of Treatment: You were found to have latent tuberculosis. You will need to follow up with infectious disease for further treatment.

## 2022-07-28 NOTE — DISCHARGE NOTE PROVIDER - ATTENDING DISCHARGE PHYSICAL EXAMINATION:
Vital Signs Last 24 Hrs  T(C): 36.8 (01 Aug 2022 14:38), Max: 36.9 (31 Jul 2022 22:49)  T(F): 98.3 (01 Aug 2022 14:38), Max: 98.4 (31 Jul 2022 22:49)  HR: 72 (01 Aug 2022 14:38) (62 - 84)  BP: 112/84 (01 Aug 2022 14:38) (107/53 - 126/90)  BP(mean): --  RR: 16 (01 Aug 2022 14:38) (16 - 17)  SpO2: 98% (01 Aug 2022 14:38) (95% - 98%)    Parameters below as of 01 Aug 2022 14:38  Patient On (Oxygen Delivery Method): room air        CONSTITUTIONAL: NAD, well-developed  RESPIRATORY: Normal respiratory effort; lungs are clear to auscultation bilaterally  CARDIOVASCULAR: Regular rate and rhythm, normal S1 and S2, no murmur/rub/gallop; No lower extremity edema; Peripheral pulses are 2+ bilaterally  ABDOMEN: Nontender to palpation, normoactive bowel sounds, no rebound/guarding; No hepatosplenomegaly  MUSCLOSKELETAL: no clubbing or cyanosis of digits; no joint swelling or tenderness to palpation  SKIN: lesions at ankles, wrists; open lesion L post ankle erythema improving  PSYCH: A+O to person, place, and time; affect appropriate

## 2022-07-28 NOTE — PROGRESS NOTE ADULT - PROBLEM SELECTOR PLAN 1
with new lesion noted today L post ankle  other lesions crusted  cont vanco  await PCR for monkey pox, RMSF, bartonella, syphillis  await BX

## 2022-07-28 NOTE — DISCHARGE NOTE PROVIDER - CARE PROVIDER_API CALL
Zac Cast)  Infectious Disease; Internal Medicine  [address] [place] [address]  Phone: [phone]  Fax: [phone]  Follow Up Time:

## 2022-07-29 LAB
ANION GAP SERPL CALC-SCNC: 10 MMOL/L — SIGNIFICANT CHANGE UP (ref 7–14)
B HENSELAE IGG SER-ACNC: NEGATIVE TITER — SIGNIFICANT CHANGE UP
B HENSELAE IGM SER-ACNC: NEGATIVE TITER — SIGNIFICANT CHANGE UP
B QUINTANA IGG SERPL-ACNC: NEGATIVE TITER — SIGNIFICANT CHANGE UP
B QUINTANA IGM SER-ACNC: NEGATIVE TITER — SIGNIFICANT CHANGE UP
BUN SERPL-MCNC: 16 MG/DL — SIGNIFICANT CHANGE UP (ref 7–23)
CALCIUM SERPL-MCNC: 9.4 MG/DL — SIGNIFICANT CHANGE UP (ref 8.4–10.5)
CHLORIDE SERPL-SCNC: 100 MMOL/L — SIGNIFICANT CHANGE UP (ref 98–107)
CO2 SERPL-SCNC: 25 MMOL/L — SIGNIFICANT CHANGE UP (ref 22–31)
CREAT SERPL-MCNC: 0.78 MG/DL — SIGNIFICANT CHANGE UP (ref 0.5–1.3)
EGFR: 123 ML/MIN/1.73M2 — SIGNIFICANT CHANGE UP
GAMMA INTERFERON BACKGROUND BLD IA-ACNC: 0.05 IU/ML — SIGNIFICANT CHANGE UP
GLUCOSE SERPL-MCNC: 113 MG/DL — HIGH (ref 70–99)
HCT VFR BLD CALC: 38.6 % — LOW (ref 39–50)
HGB BLD-MCNC: 13.4 G/DL — SIGNIFICANT CHANGE UP (ref 13–17)
M TB IFN-G BLD-IMP: POSITIVE
M TB IFN-G CD4+ BCKGRND COR BLD-ACNC: 0.07 IU/ML — SIGNIFICANT CHANGE UP
M TB IFN-G CD4+CD8+ BCKGRND COR BLD-ACNC: 1 IU/ML — SIGNIFICANT CHANGE UP
MAGNESIUM SERPL-MCNC: 2.5 MG/DL — SIGNIFICANT CHANGE UP (ref 1.6–2.6)
MCHC RBC-ENTMCNC: 29.7 PG — SIGNIFICANT CHANGE UP (ref 27–34)
MCHC RBC-ENTMCNC: 34.7 GM/DL — SIGNIFICANT CHANGE UP (ref 32–36)
MCV RBC AUTO: 85.6 FL — SIGNIFICANT CHANGE UP (ref 80–100)
NRBC # BLD: 0 /100 WBCS — SIGNIFICANT CHANGE UP
NRBC # FLD: 0 K/UL — SIGNIFICANT CHANGE UP
PHOSPHATE SERPL-MCNC: 4.4 MG/DL — SIGNIFICANT CHANGE UP (ref 2.5–4.5)
PLATELET # BLD AUTO: 447 K/UL — HIGH (ref 150–400)
POTASSIUM SERPL-MCNC: 4.2 MMOL/L — SIGNIFICANT CHANGE UP (ref 3.5–5.3)
POTASSIUM SERPL-SCNC: 4.2 MMOL/L — SIGNIFICANT CHANGE UP (ref 3.5–5.3)
QUANT TB PLUS MITOGEN MINUS NIL: 9.57 IU/ML — SIGNIFICANT CHANGE UP
RBC # BLD: 4.51 M/UL — SIGNIFICANT CHANGE UP (ref 4.2–5.8)
RBC # FLD: 12.9 % — SIGNIFICANT CHANGE UP (ref 10.3–14.5)
SODIUM SERPL-SCNC: 135 MMOL/L — SIGNIFICANT CHANGE UP (ref 135–145)
WBC # BLD: 16.34 K/UL — HIGH (ref 3.8–10.5)
WBC # FLD AUTO: 16.34 K/UL — HIGH (ref 3.8–10.5)

## 2022-07-29 PROCEDURE — 99233 SBSQ HOSP IP/OBS HIGH 50: CPT

## 2022-07-29 PROCEDURE — 99232 SBSQ HOSP IP/OBS MODERATE 35: CPT

## 2022-07-29 RX ORDER — CEFAZOLIN SODIUM 1 G
1000 VIAL (EA) INJECTION EVERY 8 HOURS
Refills: 0 | Status: DISCONTINUED | OUTPATIENT
Start: 2022-07-29 | End: 2022-07-29

## 2022-07-29 RX ORDER — IBUPROFEN 200 MG
400 TABLET ORAL EVERY 6 HOURS
Refills: 0 | Status: DISCONTINUED | OUTPATIENT
Start: 2022-07-29 | End: 2022-08-01

## 2022-07-29 RX ORDER — CEFAZOLIN SODIUM 1 G
1000 VIAL (EA) INJECTION EVERY 8 HOURS
Refills: 0 | Status: DISCONTINUED | OUTPATIENT
Start: 2022-07-29 | End: 2022-08-01

## 2022-07-29 RX ADMIN — Medication 100 MILLIGRAM(S): at 10:27

## 2022-07-29 RX ADMIN — Medication 100 MILLIGRAM(S): at 13:02

## 2022-07-29 RX ADMIN — Medication 100 MILLIGRAM(S): at 18:28

## 2022-07-29 RX ADMIN — Medication 100 MILLIGRAM(S): at 05:30

## 2022-07-29 NOTE — PROGRESS NOTE ADULT - PROBLEM SELECTOR PLAN 1
with new lesion noted today L post ankle  other lesions crusted  cefazolin  monkey pox P,   RMSF P,   bartonella, syphillis neg  quant gold +, neg CXR  await BX  will need outpt f/u for AFB cx of lesion as poss non MTB cause of lesion

## 2022-07-30 LAB
-  CLINDAMYCIN: SIGNIFICANT CHANGE UP
-  PENICILLIN: SIGNIFICANT CHANGE UP
-  VANCOMYCIN: SIGNIFICANT CHANGE UP
ANION GAP SERPL CALC-SCNC: 15 MMOL/L — HIGH (ref 7–14)
BASOPHILS # BLD AUTO: 0.05 K/UL — SIGNIFICANT CHANGE UP (ref 0–0.2)
BASOPHILS NFR BLD AUTO: 0.4 % — SIGNIFICANT CHANGE UP (ref 0–2)
BUN SERPL-MCNC: 16 MG/DL — SIGNIFICANT CHANGE UP (ref 7–23)
CALCIUM SERPL-MCNC: 9.6 MG/DL — SIGNIFICANT CHANGE UP (ref 8.4–10.5)
CHLORIDE SERPL-SCNC: 99 MMOL/L — SIGNIFICANT CHANGE UP (ref 98–107)
CO2 SERPL-SCNC: 23 MMOL/L — SIGNIFICANT CHANGE UP (ref 22–31)
CREAT SERPL-MCNC: 0.57 MG/DL — SIGNIFICANT CHANGE UP (ref 0.5–1.3)
EGFR: 135 ML/MIN/1.73M2 — SIGNIFICANT CHANGE UP
EOSINOPHIL # BLD AUTO: 0.21 K/UL — SIGNIFICANT CHANGE UP (ref 0–0.5)
EOSINOPHIL NFR BLD AUTO: 1.9 % — SIGNIFICANT CHANGE UP (ref 0–6)
GLUCOSE SERPL-MCNC: 162 MG/DL — HIGH (ref 70–99)
HCT VFR BLD CALC: 38.2 % — LOW (ref 39–50)
HGB BLD-MCNC: 13 G/DL — SIGNIFICANT CHANGE UP (ref 13–17)
IANC: 7.43 K/UL — HIGH (ref 1.8–7.4)
IMM GRANULOCYTES NFR BLD AUTO: 0.7 % — SIGNIFICANT CHANGE UP (ref 0–1.5)
LYMPHOCYTES # BLD AUTO: 2.55 K/UL — SIGNIFICANT CHANGE UP (ref 1–3.3)
LYMPHOCYTES # BLD AUTO: 22.6 % — SIGNIFICANT CHANGE UP (ref 13–44)
MAGNESIUM SERPL-MCNC: 2.4 MG/DL — SIGNIFICANT CHANGE UP (ref 1.6–2.6)
MCHC RBC-ENTMCNC: 29.5 PG — SIGNIFICANT CHANGE UP (ref 27–34)
MCHC RBC-ENTMCNC: 34 GM/DL — SIGNIFICANT CHANGE UP (ref 32–36)
MCV RBC AUTO: 86.6 FL — SIGNIFICANT CHANGE UP (ref 80–100)
METHOD TYPE: SIGNIFICANT CHANGE UP
MONOCYTES # BLD AUTO: 0.97 K/UL — HIGH (ref 0–0.9)
MONOCYTES NFR BLD AUTO: 8.6 % — SIGNIFICANT CHANGE UP (ref 2–14)
NEUTROPHILS # BLD AUTO: 7.43 K/UL — HIGH (ref 1.8–7.4)
NEUTROPHILS NFR BLD AUTO: 65.8 % — SIGNIFICANT CHANGE UP (ref 43–77)
NONVAR ORTHPX DNA SPEC QL NAA+PROBE: SIGNIFICANT CHANGE UP
NRBC # BLD: 0 /100 WBCS — SIGNIFICANT CHANGE UP
NRBC # FLD: 0 K/UL — SIGNIFICANT CHANGE UP
PHOSPHATE SERPL-MCNC: 4.3 MG/DL — SIGNIFICANT CHANGE UP (ref 2.5–4.5)
PLATELET # BLD AUTO: 445 K/UL — HIGH (ref 150–400)
POTASSIUM SERPL-MCNC: 3.7 MMOL/L — SIGNIFICANT CHANGE UP (ref 3.5–5.3)
POTASSIUM SERPL-SCNC: 3.7 MMOL/L — SIGNIFICANT CHANGE UP (ref 3.5–5.3)
R RICKETTSI AB SER-ACNC: NEGATIVE — SIGNIFICANT CHANGE UP
R RICKETTSI IGM SER-ACNC: 1.01 INDEX — HIGH (ref 0–0.89)
RBC # BLD: 4.41 M/UL — SIGNIFICANT CHANGE UP (ref 4.2–5.8)
RBC # FLD: 12.9 % — SIGNIFICANT CHANGE UP (ref 10.3–14.5)
RICK SF IGG TITR SER IF: NEGATIVE — SIGNIFICANT CHANGE UP
RICK SF IGM TITR SER IF: 1.01 INDEX — HIGH (ref 0–0.89)
SODIUM SERPL-SCNC: 137 MMOL/L — SIGNIFICANT CHANGE UP (ref 135–145)
WBC # BLD: 11.29 K/UL — HIGH (ref 3.8–10.5)
WBC # FLD AUTO: 11.29 K/UL — HIGH (ref 3.8–10.5)

## 2022-07-30 PROCEDURE — 99232 SBSQ HOSP IP/OBS MODERATE 35: CPT

## 2022-07-30 RX ADMIN — Medication 100 MILLIGRAM(S): at 01:35

## 2022-07-30 RX ADMIN — Medication 100 MILLIGRAM(S): at 12:08

## 2022-07-30 RX ADMIN — Medication 100 MILLIGRAM(S): at 18:46

## 2022-07-30 NOTE — PROGRESS NOTE ADULT - PROVIDER SPECIALTY LIST ADULT
LUMBAR SPONDYLOLISTHESIS AND STENOSIS    The term spondylolisthesis comes from Latin and literally means one bone slipping on another.  This slipping can be caused congenitally (we are born with) or degeneratively.  The term stenosis comes from Latin and means to “constrict” or \"narrow\".  Lumbar refers to the 5 vertebrae and discs that you have in your lower back.  Narrowing of the tube where nerves travel is caused by disc narrowing (degeneration), arthritis (bone spurs) and thickening of ligaments.  These cause your spinal nerves to be compressed and lead to symptoms of sciatica: numbness, tingling, heaviness, weakness or pain that radiates from your lower back, into your buttocks, and down your legs or claudication:  Decreased ability walk. These symptoms can worsen with activity and over time can progress.        TREATMENT OPTIONS      Non-Surgical    Surgical     Rest     Lumbar Laminectomy     Ice/heat     +/- Fusion    Activity modification       Diet/Exercise                     Medications      Anti-inflammatories (naproxen=Aleve, ibuprofen=Motrin/Advil)   Tylenol (acetominophen)   Muscle Relaxants   Narcotics   Neuropathic medications (Gabapentin/Lyrica)        Therapeutics     Acupuncture    Chiropractic care   Massage therapy   Myofascial release   Physical therapy   Yoga      Pain Management     Epidural Steroid Injections   Facet blocks/ radio frequency ablation (burning of the nerves)   SI joint injection   Spinal cord stimulator [email or web address]    Pain pump    For more information regarding your condition please visit [email or web address].      Next Steps:    Surgery  Type of surgery: L3-4 and L4-5 Oblique Lumbar Interbody Fusion (OLIF).    We discussed this surgery in detail with the patient via the use of the patient's MRI as well as a model.  We went over the potential benefits as well as the usual postoperative course and expectations.  We emphasized how stressful and taxing surgery can be on our  physical and emotional stamina and how important it is to be psychologically prepared.  Risks were discussed in detail and these included, but were not limited to bleeding, infection, medical and anesthetic complications, spinal fluid leaks (headaches), nerve damage, paralysis, need for further surgery and death.  In the case of cervical surgery we stressed that upwards of 50% of people have swallowing and voice issues postoperatively.  It was also emphasized how highly variable nerve healing can be and that it may take 12-18 months to achieve maximal healing and that there is no way to predict outcomes for any individual.  The patient was encouraged to perform their own research on Nativo as well as PITO when given to them by the surgery coordinator.    Please contact Bernie to schedule your surgery at [phone].  She will coordinate the timing of your surgery and instruct you to obtain a pre-operative History & Physical examination with your primary doctor.  This is required in order to proceed with your surgery.    Thank you for choosing Dr. Jett and his team as your [facility]ists!    Office hours: 8:00 am to 5:00 pm, Monday through Friday.  Phone: [phone]       Hospitalist

## 2022-07-30 NOTE — PROGRESS NOTE ADULT - PROBLEM SELECTOR PLAN 4
DVT: Lovenox  Diet: Regular  Dispo: SW consult as patient is undomiciled

## 2022-07-31 LAB
ANION GAP SERPL CALC-SCNC: 12 MMOL/L — SIGNIFICANT CHANGE UP (ref 7–14)
BASOPHILS # BLD AUTO: 0.06 K/UL — SIGNIFICANT CHANGE UP (ref 0–0.2)
BASOPHILS NFR BLD AUTO: 0.6 % — SIGNIFICANT CHANGE UP (ref 0–2)
BUN SERPL-MCNC: 13 MG/DL — SIGNIFICANT CHANGE UP (ref 7–23)
CALCIUM SERPL-MCNC: 9.4 MG/DL — SIGNIFICANT CHANGE UP (ref 8.4–10.5)
CHLORIDE SERPL-SCNC: 97 MMOL/L — LOW (ref 98–107)
CO2 SERPL-SCNC: 25 MMOL/L — SIGNIFICANT CHANGE UP (ref 22–31)
CREAT SERPL-MCNC: 0.5 MG/DL — SIGNIFICANT CHANGE UP (ref 0.5–1.3)
EGFR: 141 ML/MIN/1.73M2 — SIGNIFICANT CHANGE UP
EOSINOPHIL # BLD AUTO: 0.24 K/UL — SIGNIFICANT CHANGE UP (ref 0–0.5)
EOSINOPHIL NFR BLD AUTO: 2.6 % — SIGNIFICANT CHANGE UP (ref 0–6)
GLUCOSE SERPL-MCNC: 94 MG/DL — SIGNIFICANT CHANGE UP (ref 70–99)
HCT VFR BLD CALC: 38.7 % — LOW (ref 39–50)
HGB BLD-MCNC: 13 G/DL — SIGNIFICANT CHANGE UP (ref 13–17)
IANC: 5.24 K/UL — SIGNIFICANT CHANGE UP (ref 1.8–7.4)
IMM GRANULOCYTES NFR BLD AUTO: 0.6 % — SIGNIFICANT CHANGE UP (ref 0–1.5)
LYMPHOCYTES # BLD AUTO: 2.91 K/UL — SIGNIFICANT CHANGE UP (ref 1–3.3)
LYMPHOCYTES # BLD AUTO: 31.4 % — SIGNIFICANT CHANGE UP (ref 13–44)
MAGNESIUM SERPL-MCNC: 2.4 MG/DL — SIGNIFICANT CHANGE UP (ref 1.6–2.6)
MCHC RBC-ENTMCNC: 28.8 PG — SIGNIFICANT CHANGE UP (ref 27–34)
MCHC RBC-ENTMCNC: 33.6 GM/DL — SIGNIFICANT CHANGE UP (ref 32–36)
MCV RBC AUTO: 85.8 FL — SIGNIFICANT CHANGE UP (ref 80–100)
MONOCYTES # BLD AUTO: 0.76 K/UL — SIGNIFICANT CHANGE UP (ref 0–0.9)
MONOCYTES NFR BLD AUTO: 8.2 % — SIGNIFICANT CHANGE UP (ref 2–14)
NEUTROPHILS # BLD AUTO: 5.24 K/UL — SIGNIFICANT CHANGE UP (ref 1.8–7.4)
NEUTROPHILS NFR BLD AUTO: 56.6 % — SIGNIFICANT CHANGE UP (ref 43–77)
NRBC # BLD: 0 /100 WBCS — SIGNIFICANT CHANGE UP
NRBC # FLD: 0 K/UL — SIGNIFICANT CHANGE UP
PHOSPHATE SERPL-MCNC: 4.5 MG/DL — SIGNIFICANT CHANGE UP (ref 2.5–4.5)
PLATELET # BLD AUTO: 475 K/UL — HIGH (ref 150–400)
POTASSIUM SERPL-MCNC: 3.4 MMOL/L — LOW (ref 3.5–5.3)
POTASSIUM SERPL-SCNC: 3.4 MMOL/L — LOW (ref 3.5–5.3)
RBC # BLD: 4.51 M/UL — SIGNIFICANT CHANGE UP (ref 4.2–5.8)
RBC # FLD: 12.8 % — SIGNIFICANT CHANGE UP (ref 10.3–14.5)
SODIUM SERPL-SCNC: 134 MMOL/L — LOW (ref 135–145)
WBC # BLD: 9.27 K/UL — SIGNIFICANT CHANGE UP (ref 3.8–10.5)
WBC # FLD AUTO: 9.27 K/UL — SIGNIFICANT CHANGE UP (ref 3.8–10.5)

## 2022-07-31 PROCEDURE — 99232 SBSQ HOSP IP/OBS MODERATE 35: CPT

## 2022-07-31 RX ORDER — POTASSIUM CHLORIDE 20 MEQ
40 PACKET (EA) ORAL ONCE
Refills: 0 | Status: COMPLETED | OUTPATIENT
Start: 2022-07-31 | End: 2022-07-31

## 2022-07-31 RX ADMIN — Medication 40 MILLIEQUIVALENT(S): at 10:53

## 2022-07-31 RX ADMIN — Medication 100 MILLIGRAM(S): at 10:53

## 2022-07-31 RX ADMIN — Medication 100 MILLIGRAM(S): at 01:58

## 2022-07-31 RX ADMIN — Medication 100 MILLIGRAM(S): at 18:34

## 2022-07-31 NOTE — PROGRESS NOTE ADULT - PROBLEM SELECTOR PLAN 1
multiple lesions on R and L leg  cefazolin  monkey pox P,   RMSF P,   bartonella, syphillis neg  quant gold +, neg CXR  await BX  will need outpt f/u for AFB cx of lesion as poss non MTB cause of lesion  will f/u ID multiple lesions on R and L leg  cefazolin  monkey pox and RMSF, bartonella, syphillis neg  quant gold +, neg CXR  await BX  will need outpt f/u for AFB cx of lesion as poss non MTB cause of lesion  f/u ID rec

## 2022-08-01 VITALS
HEART RATE: 72 BPM | DIASTOLIC BLOOD PRESSURE: 84 MMHG | RESPIRATION RATE: 16 BRPM | SYSTOLIC BLOOD PRESSURE: 112 MMHG | OXYGEN SATURATION: 98 % | TEMPERATURE: 98 F

## 2022-08-01 LAB
ANION GAP SERPL CALC-SCNC: 12 MMOL/L — SIGNIFICANT CHANGE UP (ref 7–14)
BASOPHILS # BLD AUTO: 0.06 K/UL — SIGNIFICANT CHANGE UP (ref 0–0.2)
BASOPHILS NFR BLD AUTO: 0.7 % — SIGNIFICANT CHANGE UP (ref 0–2)
BUN SERPL-MCNC: 15 MG/DL — SIGNIFICANT CHANGE UP (ref 7–23)
CALCIUM SERPL-MCNC: 9.3 MG/DL — SIGNIFICANT CHANGE UP (ref 8.4–10.5)
CHLORIDE SERPL-SCNC: 99 MMOL/L — SIGNIFICANT CHANGE UP (ref 98–107)
CO2 SERPL-SCNC: 24 MMOL/L — SIGNIFICANT CHANGE UP (ref 22–31)
CREAT SERPL-MCNC: 0.48 MG/DL — LOW (ref 0.5–1.3)
CULTURE RESULTS: SIGNIFICANT CHANGE UP
EGFR: 142 ML/MIN/1.73M2 — SIGNIFICANT CHANGE UP
EOSINOPHIL # BLD AUTO: 0.19 K/UL — SIGNIFICANT CHANGE UP (ref 0–0.5)
EOSINOPHIL NFR BLD AUTO: 2.1 % — SIGNIFICANT CHANGE UP (ref 0–6)
GLUCOSE SERPL-MCNC: 86 MG/DL — SIGNIFICANT CHANGE UP (ref 70–99)
HCT VFR BLD CALC: 37.2 % — LOW (ref 39–50)
HGB BLD-MCNC: 12.7 G/DL — LOW (ref 13–17)
IANC: 4.94 K/UL — SIGNIFICANT CHANGE UP (ref 1.8–7.4)
IMM GRANULOCYTES NFR BLD AUTO: 0.7 % — SIGNIFICANT CHANGE UP (ref 0–1.5)
LYMPHOCYTES # BLD AUTO: 3.11 K/UL — SIGNIFICANT CHANGE UP (ref 1–3.3)
LYMPHOCYTES # BLD AUTO: 34.3 % — SIGNIFICANT CHANGE UP (ref 13–44)
MAGNESIUM SERPL-MCNC: 2.3 MG/DL — SIGNIFICANT CHANGE UP (ref 1.6–2.6)
MCHC RBC-ENTMCNC: 29.2 PG — SIGNIFICANT CHANGE UP (ref 27–34)
MCHC RBC-ENTMCNC: 34.1 GM/DL — SIGNIFICANT CHANGE UP (ref 32–36)
MCV RBC AUTO: 85.5 FL — SIGNIFICANT CHANGE UP (ref 80–100)
MONOCYTES # BLD AUTO: 0.7 K/UL — SIGNIFICANT CHANGE UP (ref 0–0.9)
MONOCYTES NFR BLD AUTO: 7.7 % — SIGNIFICANT CHANGE UP (ref 2–14)
NEUTROPHILS # BLD AUTO: 4.94 K/UL — SIGNIFICANT CHANGE UP (ref 1.8–7.4)
NEUTROPHILS NFR BLD AUTO: 54.5 % — SIGNIFICANT CHANGE UP (ref 43–77)
NRBC # BLD: 0 /100 WBCS — SIGNIFICANT CHANGE UP
NRBC # FLD: 0 K/UL — SIGNIFICANT CHANGE UP
ORGANISM # SPEC MICROSCOPIC CNT: SIGNIFICANT CHANGE UP
ORGANISM # SPEC MICROSCOPIC CNT: SIGNIFICANT CHANGE UP
PHOSPHATE SERPL-MCNC: 3.9 MG/DL — SIGNIFICANT CHANGE UP (ref 2.5–4.5)
PLATELET # BLD AUTO: 502 K/UL — HIGH (ref 150–400)
POTASSIUM SERPL-MCNC: 3.5 MMOL/L — SIGNIFICANT CHANGE UP (ref 3.5–5.3)
POTASSIUM SERPL-SCNC: 3.5 MMOL/L — SIGNIFICANT CHANGE UP (ref 3.5–5.3)
RBC # BLD: 4.35 M/UL — SIGNIFICANT CHANGE UP (ref 4.2–5.8)
RBC # FLD: 12.6 % — SIGNIFICANT CHANGE UP (ref 10.3–14.5)
SODIUM SERPL-SCNC: 135 MMOL/L — SIGNIFICANT CHANGE UP (ref 135–145)
SPECIMEN SOURCE: SIGNIFICANT CHANGE UP
WBC # BLD: 9.06 K/UL — SIGNIFICANT CHANGE UP (ref 3.8–10.5)
WBC # FLD AUTO: 9.06 K/UL — SIGNIFICANT CHANGE UP (ref 3.8–10.5)

## 2022-08-01 PROCEDURE — 99239 HOSP IP/OBS DSCHRG MGMT >30: CPT

## 2022-08-01 PROCEDURE — 99232 SBSQ HOSP IP/OBS MODERATE 35: CPT

## 2022-08-01 RX ORDER — CEPHALEXIN 500 MG
1 CAPSULE ORAL
Qty: 12 | Refills: 0
Start: 2022-08-01 | End: 2022-08-03

## 2022-08-01 RX ORDER — CEPHALEXIN 500 MG
1 CAPSULE ORAL
Qty: 24 | Refills: 0
Start: 2022-08-01 | End: 2022-08-06

## 2022-08-01 RX ADMIN — Medication 100 MILLIGRAM(S): at 12:01

## 2022-08-01 RX ADMIN — Medication 100 MILLIGRAM(S): at 01:55

## 2022-08-01 NOTE — PROVIDER CONTACT NOTE (OTHER) - REASON
Pt left without signing discharge paperwork, pt left without medications. Pt removed own IV. Belongings with pt. ACP Nancie aware.
Oral temp of 100.1F. Pt refuses rectal temp. Pt states he feels hot.
Pt requesting cough suppressant for his cough.

## 2022-08-01 NOTE — PROGRESS NOTE ADULT - PROBLEM SELECTOR PLAN 2
Patient with nonproductive cough for several days  No crackles or stridor on lung exam  Quantiferon gold +, CXR neg, will need outpt f/u for eval for latent TB treatment  RVP Positive for Parainfluenza  C/W supportive care and symptom control  non toxic appearing
Patient with nonproductive cough for several days  No crackles or stridor on lung exam  CXR without any focal consolidations  Quantiferon gold sent, P result  RVP Positive for Parainfluenza  C/W supportive care and symptom control  non toxic appearing
Patient with nonproductive cough for several days--improving  No crackles or stridor on lung exam  Quantiferon gold +, CXR neg, will need outpt f/u for eval for latent TB treatment  RVP Positive for Parainfluenza  C/W supportive care and symptom control  non toxic appearing

## 2022-08-01 NOTE — DISCHARGE NOTE NURSING/CASE MANAGEMENT/SOCIAL WORK - NSDCPEFALRISK_GEN_ALL_CORE
For information on Fall & Injury Prevention, visit: [email or web address] OR  [email or web address] OR  https://www.cdc.gov/steadi/patient.html

## 2022-08-01 NOTE — DISCHARGE NOTE NURSING/CASE MANAGEMENT/SOCIAL WORK - PATIENT PORTAL LINK FT
You can access the FollowMyHealth Patient Portal offered by [facility] by registering at the following website: [email or web address]. By joining [employer]’s FollowMyHealth portal, you will also be able to view your health information using other applications (apps) compatible with our system.

## 2022-08-01 NOTE — PROVIDER CONTACT NOTE (OTHER) - SITUATION
Pt left without signing discharge paperwork, pt left without medications. Pt removed own IV. Belongings with pt. ACP Nancie aware.
Oral temp of 100.1F. Pt refuses rectal temp. Pt states he feels hot. Pt asked if he wants tylenol for the fever and says he does not want it because he does not have a fever.
Pt requesting cough suppressant for his cough.

## 2022-08-01 NOTE — PROVIDER CONTACT NOTE (OTHER) - ASSESSMENT
Pt is A&Ox4. NAD noted.
Pt is A&Ox4. NAD noted.
Pt left without signing discharge paperwork, pt left without medications. Pt removed own IV. Belongings with pt. ACP Nancie aware.

## 2022-08-01 NOTE — PROGRESS NOTE ADULT - SUBJECTIVE AND OBJECTIVE BOX
CC: F/U for wound infection    Saw/spoke to patient. Unchanged. No new events.     Allergies  No Known Allergies    ANTIMICROBIALS:  ceFAZolin   IVPB 1000 every 8 hours    PE:    Vital Signs Last 24 Hrs  T(C): 36.8 (01 Aug 2022 14:38), Max: 36.9 (31 Jul 2022 22:49)  T(F): 98.3 (01 Aug 2022 14:38), Max: 98.4 (31 Jul 2022 22:49)  HR: 72 (01 Aug 2022 14:38) (62 - 84)  BP: 112/84 (01 Aug 2022 14:38) (105/58 - 126/90)  RR: 16 (01 Aug 2022 14:38) (16 - 17)  SpO2: 98% (01 Aug 2022 14:38) (95% - 99%)    Gen: AOx3, NAD, non-toxic  CV: Nontachycardic  Resp: Breathing comfortably, RA  Abd: Soft, nontender  IV/Skin: No thrombophlebitis    LABS:                        12.7   9.06  )-----------( 502      ( 01 Aug 2022 07:00 )             37.2     08-01    135  |  99  |  15  ----------------------------<  86  3.5   |  24  |  0.48<L>    Ca    9.3      01 Aug 2022 07:00  Phos  3.9     08-01  Mg     2.30     08-01    MICROBIOLOGY:    .Abscess Leg - Left  07-27-22   Moderate Streptococcus pyogenes (Group A)  Penicillin and ampicillin are drugs of choice for  treatment of beta-hemolytic streptococcal infections.  Susceptibility testing is not performed routinely because  S. pyogenes (GAS) is universally susceptible to penicillin  and resistance in other strains is extremely rare.  --  Streptococcus pyogenes (Group A)    .Blood Blood-Venous  07-27-22   No growth to date.  --  --    .Blood Blood  07-27-22   No growth to date.  --  --    Rapid RVP Result: Detected (07-27 @ 07:10) Parainfluenza    (otherwise reviewed)    RADIOLOGY:    7/27 XR:    FINDINGS:  Both lungs are equally aerated and free of any focal abnormalities. The   heart is not enlarged and there is no effusion.        COMPARISON:  None Available        IMPRESSION:  No acute pulmonary disease.
PROGRESS NOTE:     Patient is a 32y old  Male who presents with a chief complaint of Rash (31 Jul 2022 18:18)      SUBJECTIVE / OVERNIGHT EVENTS: no acute event overnight.     ADDITIONAL REVIEW OF SYSTEMS: Reports  feeling good and that rashes are improving. Denies cp, sob, abd pain, n/v/d, fever, chills, dysuria, cough.    MEDICATIONS  (STANDING):  ceFAZolin   IVPB 1000 milliGRAM(s) IV Intermittent every 8 hours  enoxaparin Injectable 40 milliGRAM(s) SubCutaneous every 24 hours    MEDICATIONS  (PRN):  acetaminophen     Tablet .. 650 milliGRAM(s) Oral every 6 hours PRN Temp greater or equal to 38C (100.4F), Mild Pain (1 - 3)  ibuprofen  Tablet. 400 milliGRAM(s) Oral every 6 hours PRN Mild Pain (1 - 3), Moderate Pain (4 - 6)      CAPILLARY BLOOD GLUCOSE        I&O's Summary      PHYSICAL EXAM:  Vital Signs Last 24 Hrs  T(C): 36.8 (01 Aug 2022 14:38), Max: 36.9 (31 Jul 2022 22:49)  T(F): 98.3 (01 Aug 2022 14:38), Max: 98.4 (31 Jul 2022 22:49)  HR: 72 (01 Aug 2022 14:38) (62 - 84)  BP: 112/84 (01 Aug 2022 14:38) (107/53 - 126/90)  BP(mean): --  RR: 16 (01 Aug 2022 14:38) (16 - 17)  SpO2: 98% (01 Aug 2022 14:38) (95% - 98%)    Parameters below as of 01 Aug 2022 14:38  Patient On (Oxygen Delivery Method): room air        CONSTITUTIONAL: NAD, well-developed  RESPIRATORY: Normal respiratory effort; lungs are clear to auscultation bilaterally  CARDIOVASCULAR: Regular rate and rhythm, normal S1 and S2, no murmur/rub/gallop; No lower extremity edema; Peripheral pulses are 2+ bilaterally  ABDOMEN: Nontender to palpation, normoactive bowel sounds, no rebound/guarding; No hepatosplenomegaly  MUSCLOSKELETAL: no clubbing or cyanosis of digits; no joint swelling or tenderness to palpation  SKIN: lesions at ankles, wrists; open lesion L post ankle erythema improving  PSYCH: A+O to person, place, and time; affect appropriate  LABS:                        12.7   9.06  )-----------( 502      ( 01 Aug 2022 07:00 )             37.2     08-01    135  |  99  |  15  ----------------------------<  86  3.5   |  24  |  0.48<L>    Ca    9.3      01 Aug 2022 07:00  Phos  3.9     08-01  Mg     2.30     08-01                  RADIOLOGY & ADDITIONAL TESTS:  Results Reviewed:   Imaging Personally Reviewed:  Electrocardiogram Personally Reviewed:    COORDINATION OF CARE:  Care Discussed with Consultants/Other Providers [Y/N]:  Prior or Outpatient Records Reviewed [Y/N]:  
CC: F/U for Pustules    Saw/spoke to patient. Patient unchanged. No new events.    Allergies  No Known Allergies    ANTIMICROBIALS:  doxycycline monohydrate Capsule 100 every 12 hours    PE:    Vital Signs Last 24 Hrs  T(C): 37 (29 Jul 2022 13:36), Max: 37.5 (28 Jul 2022 22:43)  T(F): 98.6 (29 Jul 2022 13:36), Max: 99.5 (28 Jul 2022 22:43)  HR: 61 (29 Jul 2022 13:36) (61 - 83)  BP: 105/67 (29 Jul 2022 13:36) (105/67 - 113/62)  RR: 18 (29 Jul 2022 13:36) (17 - 18)  SpO2: 98% (29 Jul 2022 13:36) (97% - 99%)    Gen: AOx3, NAD, non-toxic  CV: Nontachycardic  Resp: Breathing comfortably, RA  Abd: Soft, nontender  IV/Skin: Pustular lesions of ankles and wrists    LABS:                        13.1   20.49 )-----------( 448      ( 28 Jul 2022 10:19 )             38.0     07-28    137  |  98  |  11  ----------------------------<  140<H>  3.3<L>   |  25  |  0.62    Ca    9.5      28 Jul 2022 10:19  Phos  3.7     07-28  Mg     2.10     07-28    MICROBIOLOGY:    .Abscess Leg - Left  07-27-22   Moderate Streptococcus pyogenes (Group A)  Penicillin and ampicillin are drugs of choice for  treatment of beta-hemolytic streptococcal infections.  Susceptibility testing is not performed routinely because  S. pyogenes (GAS) is universally susceptible to penicillin  and resistance in other strains is extremely rare.  --    No polymorphonuclear leukocytes seen per low power field  Rare Gram positive cocci in pairs seen per oil power field    .Blood Blood-Venous  07-27-22   No growth to date.  --  --    .Blood Blood  07-27-22   No growth to date.  --  --    Rapid RVP Result: Detected (07-27 @ 07:10)    RADIOLOGY:    7/27 XR:    FINDINGS:  Both lungs are equally aerated and free of any focal abnormalities. The   heart is not enlarged and there is no effusion.        COMPARISON:  None Available        IMPRESSION:  No acute pulmonary disease.
CC: F/U for wound infection    Saw/spoke to patient. No fevers, no chills. No new complaints.    Allergies  No Known Allergies    ANTIMICROBIALS:  doxycycline monohydrate Capsule 100 every 12 hours    PE:    Vital Signs Last 24 Hrs  T(C): 37.1 (28 Jul 2022 14:20), Max: 37.8 (28 Jul 2022 05:32)  T(F): 98.8 (28 Jul 2022 14:20), Max: 100.1 (28 Jul 2022 05:32)  HR: 90 (28 Jul 2022 14:20) (74 - 90)  BP: 101/61 (28 Jul 2022 14:20) (100/55 - 118/68)  RR: 17 (28 Jul 2022 14:20) (17 - 18)  SpO2: 99% (28 Jul 2022 14:20) (97% - 100%)    Gen: AOx3, NAD, non-toxic  CV: Nontachycardic  Resp: Breathing comfortably, RA  Abd: Soft, nontender  IV/Skin: Rash unchanged    LABS:                        13.1   20.49 )-----------( 448      ( 28 Jul 2022 10:19 )             38.0     07-28    137  |  98  |  11  ----------------------------<  140<H>  3.3<L>   |  25  |  0.62    Ca    9.5      28 Jul 2022 10:19  Phos  3.7     07-28  Mg     2.10     07-28    TPro  8.4<H>  /  Alb  4.0  /  TBili  0.4  /  DBili  x   /  AST  27  /  ALT  37  /  AlkPhos  88  07-27    MICROBIOLOGY:    .Tissue Other, left leg  07-27-22   Testing in progress  --    No polymorphonuclear leukocytes seen per low power field  Rare Gram positive cocci in pairs seen per oil power field    Rapid RVP Result: Detected (07-27 @ 07:10)    (otherwise reviewed)    RADIOLOGY:    7/27    FINDINGS:  Both lungs are equally aerated and free of any focal abnormalities. The   heart is not enlarged and there is no effusion.        COMPARISON:  None Available        IMPRESSION:  No acute pulmonary disease.
PROGRESS NOTE:     Patient is a 32y old  Male who presents with a chief complaint of Rash (30 Jul 2022 19:27)      SUBJECTIVE / OVERNIGHT EVENTS: no acute event overnight.    ADDITIONAL REVIEW OF SYSTEMS: Reports  feeling good today. Rashes are improving. Denies cp, sob, abd pain, n/v/d, fever, chills, dysuria, cough.    MEDICATIONS  (STANDING):  ceFAZolin   IVPB 1000 milliGRAM(s) IV Intermittent every 8 hours  enoxaparin Injectable 40 milliGRAM(s) SubCutaneous every 24 hours    MEDICATIONS  (PRN):  acetaminophen     Tablet .. 650 milliGRAM(s) Oral every 6 hours PRN Temp greater or equal to 38C (100.4F), Mild Pain (1 - 3)  ibuprofen  Tablet. 400 milliGRAM(s) Oral every 6 hours PRN Mild Pain (1 - 3), Moderate Pain (4 - 6)      CAPILLARY BLOOD GLUCOSE        I&O's Summary      PHYSICAL EXAM:  Vital Signs Last 24 Hrs  T(C): 36.7 (31 Jul 2022 15:33), Max: 36.7 (30 Jul 2022 21:56)  T(F): 98.1 (31 Jul 2022 15:33), Max: 98.1 (30 Jul 2022 21:56)  HR: 73 (31 Jul 2022 15:33) (60 - 73)  BP: 105/58 (31 Jul 2022 15:33) (100/55 - 111/61)  BP(mean): --  RR: 16 (31 Jul 2022 15:33) (16 - 17)  SpO2: 99% (31 Jul 2022 15:33) (96% - 99%)    Parameters below as of 31 Jul 2022 15:33  Patient On (Oxygen Delivery Method): room air        CONSTITUTIONAL: NAD, well-developed  RESPIRATORY: Normal respiratory effort; lungs are clear to auscultation bilaterally  CARDIOVASCULAR: Regular rate and rhythm, normal S1 and S2, no murmur/rub/gallop; No lower extremity edema; Peripheral pulses are 2+ bilaterally  ABDOMEN: Nontender to palpation, normoactive bowel sounds, no rebound/guarding; No hepatosplenomegaly  MUSCLOSKELETAL: no clubbing or cyanosis of digits; no joint swelling or tenderness to palpation  SKIN: lesions at ankles, wrists; open lesion L post ankle  PSYCH: A+O to person, place, and time; affect appropriate    LABS:                        13.0   9.27  )-----------( 475      ( 31 Jul 2022 07:15 )             38.7     07-31    134<L>  |  97<L>  |  13  ----------------------------<  94  3.4<L>   |  25  |  0.50    Ca    9.4      31 Jul 2022 07:15  Phos  4.5     07-31  Mg     2.40     07-31                  RADIOLOGY & ADDITIONAL TESTS:  Results Reviewed:   Imaging Personally Reviewed:  Electrocardiogram Personally Reviewed:    COORDINATION OF CARE:  Care Discussed with Consultants/Other Providers [Y/N]:  Prior or Outpatient Records Reviewed [Y/N]:  
[facility] Division of Hospital Medicine  Luh Oliveira MD  Pager [phone]    Patient is a 32y old  Male who presents with a chief complaint of Rash      SUBJECTIVE / OVERNIGHT EVENTS: no new lesions, pt states he can't walk bc of LE bx      MEDICATIONS  (STANDING):  ceFAZolin   IVPB 1000 milliGRAM(s) IV Intermittent every 8 hours  enoxaparin Injectable 40 milliGRAM(s) SubCutaneous every 24 hours  sodium chloride 0.9%. 1000 milliLiter(s) (100 mL/Hr) IV Continuous <Continuous>    MEDICATIONS  (PRN):  acetaminophen     Tablet .. 650 milliGRAM(s) Oral every 6 hours PRN Temp greater or equal to 38C (100.4F), Mild Pain (1 - 3)        PHYSICAL EXAM:  Vital Signs Last 24 Hrs  T(F): 98.6 (29 Jul 2022 13:36), Max: 99.5 (28 Jul 2022 22:43)  HR: 61 (29 Jul 2022 13:36) (61 - 83)  BP: 105/67 (29 Jul 2022 13:36) (105/67 - 113/62)  RR: 18 (29 Jul 2022 13:36) (17 - 18)  SpO2: 98% (29 Jul 2022 13:36) (97% - 99%)    Parameters below as of 29 Jul 2022 13:36  Patient On (Oxygen Delivery Method): room air        CONSTITUTIONAL: NAD, appears comfortable  EYES: PERRLA; conjunctiva and sclera clear  ENMT: Moist oral mucosa; normal dentition  RESPIRATORY: Normal respiratory effort  CARDIOVASCULAR: Regular rate and rhythm; No lower extremity edema;   ABDOMEN: Nontender to palpation, normoactive bowel sounds  MUSCULOSKELETAL:   no clubbing or cyanosis of digits; no joint swelling or tenderness to palpation  PSYCH: A+O to person, place, and time; affect odd  NEUROLOGY: CN 2-12 are intact and symmetric; no gross sensory deficits   SKIN: no new lesions, ankle UE lesions crusted    LABS:                        13.1   20.49 )-----------( 448      ( 28 Jul 2022 10:19 )             38.0     07-28    137  |  98  |  11  ----------------------------<  140<H>  3.3<L>   |  25  |  0.62    Ca    9.5      28 Jul 2022 10:19  Phos  3.7     07-28  Mg     2.10     07-28                Culture - Tissue with Gram Stain (collected 27 Jul 2022 17:58)  Source: .Tissue Other, left leg  Gram Stain (28 Jul 2022 03:22):    No polymorphonuclear leukocytes seen per low power field    Rare Gram positive cocci in pairs seen per oil power field  Preliminary Report (28 Jul 2022 19:40):    No growth    Culture - Fungal, Tissue (collected 27 Jul 2022 17:58)  Source: .Tissue Other, left leg  Preliminary Report (28 Jul 2022 11:09):    Testing in progress    Culture - Acid Fast - Tissue w/Smear (collected 27 Jul 2022 17:58)  Source: .Tissue Other, left leg    Culture - Abscess with Gram Stain (collected 27 Jul 2022 17:58)  Source: .Abscess Leg - Left  Preliminary Report (28 Jul 2022 20:42):    Moderate Streptococcus pyogenes (Group A)    Penicillin and ampicillin are drugs of choice for    treatment of beta-hemolytic streptococcal infections.    Susceptibility testing is not performed routinely because    S. pyogenes (GAS) is universally susceptible to penicillin    and resistance in other strains is extremely rare.    Culture - Blood (collected 27 Jul 2022 12:10)  Source: .Blood Blood-Venous  Preliminary Report (28 Jul 2022 17:02):    No growth to date.    Culture - Blood (collected 27 Jul 2022 12:00)  Source: .Blood Blood  Preliminary Report (28 Jul 2022 17:02):    No growth to date.      
[facility] Division of Hospital Medicine  Luh Oliveira MD  Pager [phone]    Patient is a 32y old  Male who presents with a chief complaint of Rash       SUBJECTIVE / OVERNIGHT EVENTS: pt feeling ok; pt noted new lesion L post ankle that was draining pus      MEDICATIONS  (STANDING):  doxycycline monohydrate Capsule 100 milliGRAM(s) Oral every 12 hours  enoxaparin Injectable 40 milliGRAM(s) SubCutaneous every 24 hours  sodium chloride 0.9%. 1000 milliLiter(s) (100 mL/Hr) IV Continuous <Continuous>    MEDICATIONS  (PRN):  acetaminophen     Tablet .. 650 milliGRAM(s) Oral every 6 hours PRN Temp greater or equal to 38C (100.4F), Mild Pain (1 - 3)        PHYSICAL EXAM:  Vital Signs Last 24 Hrs  T(F): 98.8 (28 Jul 2022 14:20), Max: 100.1 (28 Jul 2022 05:32)  HR: 90 (28 Jul 2022 14:20) (74 - 90)  BP: 101/61 (28 Jul 2022 14:20) (100/55 - 118/68)  RR: 17 (28 Jul 2022 14:20) (17 - 18)  SpO2: 99% (28 Jul 2022 14:20) (97% - 100%)    Parameters below as of 28 Jul 2022 14:20  Patient On (Oxygen Delivery Method): room air        CONSTITUTIONAL: NAD, appears comfortable  EYES: PERRLA; conjunctiva and sclera clear  ENMT: Moist oral mucosa; normal dentition  RESPIRATORY: Normal respiratory effort; lungs are clear to auscultation bilaterally  CARDIOVASCULAR: Regular rate and rhythm; No lower extremity edema;   ABDOMEN: Nontender to palpation, normoactive bowel sounds  MUSCULOSKELETAL: no clubbing or cyanosis of digits; no joint swelling or tenderness to palpation  PSYCH: A+O to person, place, and time; affect appropriate  NEUROLOGY: CN 2-12 are intact and symmetric; no gross sensory deficits   SKIN: lesions at ankles, wrists; new open lesion L post ankle    LABS:                        13.1   20.49 )-----------( 448      ( 28 Jul 2022 10:19 )             38.0     07-28    137  |  98  |  11  ----------------------------<  140<H>  3.3<L>   |  25  |  0.62    Ca    9.5      28 Jul 2022 10:19  Phos  3.7     07-28  Mg     2.10     07-28      Culture - Tissue with Gram Stain (collected 27 Jul 2022 17:58)  Source: .Tissue Other, left leg  Gram Stain (28 Jul 2022 03:22):    No polymorphonuclear leukocytes seen per low power field    Rare Gram positive cocci in pairs seen per oil power field    Culture - Fungal, Tissue (collected 27 Jul 2022 17:58)  Source: .Tissue Other, left leg  Preliminary Report (28 Jul 2022 11:09):    Testing in progress        
PROGRESS NOTE:     Patient is a 32y old  Male who presents with a chief complaint of Rash (29 Jul 2022 15:11)      SUBJECTIVE / OVERNIGHT EVENTS: no acute event overnight.     ADDITIONAL REVIEW OF SYSTEMS: Reports  feeling better today. Thinks rashes are improving. Denies cp, sob, abd pain, n/v/d, fever, chills, dysuria, cough.    MEDICATIONS  (STANDING):  ceFAZolin   IVPB 1000 milliGRAM(s) IV Intermittent every 8 hours  enoxaparin Injectable 40 milliGRAM(s) SubCutaneous every 24 hours    MEDICATIONS  (PRN):  acetaminophen     Tablet .. 650 milliGRAM(s) Oral every 6 hours PRN Temp greater or equal to 38C (100.4F), Mild Pain (1 - 3)  ibuprofen  Tablet. 400 milliGRAM(s) Oral every 6 hours PRN Mild Pain (1 - 3), Moderate Pain (4 - 6)      CAPILLARY BLOOD GLUCOSE        I&O's Summary      PHYSICAL EXAM:  Vital Signs Last 24 Hrs  T(C): 36.5 (30 Jul 2022 14:53), Max: 36.7 (29 Jul 2022 22:03)  T(F): 97.7 (30 Jul 2022 14:53), Max: 98 (29 Jul 2022 22:03)  HR: 78 (30 Jul 2022 14:53) (60 - 79)  BP: 112/60 (30 Jul 2022 14:53) (100/60 - 112/60)  BP(mean): --  RR: 17 (30 Jul 2022 14:53) (17 - 17)  SpO2: 100% (30 Jul 2022 14:53) (97% - 100%)    Parameters below as of 30 Jul 2022 14:53  Patient On (Oxygen Delivery Method): room air        CONSTITUTIONAL: NAD, appears comfortable  EYES: PERRLA; conjunctiva and sclera clear  ENMT: Moist oral mucosa; normal dentition  RESPIRATORY: Normal respiratory effort; lungs are clear to auscultation bilaterally  CARDIOVASCULAR: Regular rate and rhythm; No lower extremity edema;   ABDOMEN: Nontender to palpation, normoactive bowel sounds  MUSCULOSKELETAL: no clubbing or cyanosis of digits; no joint swelling or tenderness to palpation  PSYCH: A+O to person, place, and time; affect appropriate  NEUROLOGY: CN 2-12 are intact and symmetric; no gross sensory deficits   SKIN: lesions at ankles, wrists; new open lesion L post ankle    LABS:                        13.0   11.29 )-----------( 445      ( 30 Jul 2022 09:59 )             38.2     07-30    137  |  99  |  16  ----------------------------<  162<H>  3.7   |  23  |  0.57    Ca    9.6      30 Jul 2022 09:59  Phos  4.3     07-30  Mg     2.40     07-30                  RADIOLOGY & ADDITIONAL TESTS:  Results Reviewed:   Imaging Personally Reviewed:  Electrocardiogram Personally Reviewed:    COORDINATION OF CARE:  Care Discussed with Consultants/Other Providers [Y/N]:  Prior or Outpatient Records Reviewed [Y/N]:

## 2022-08-01 NOTE — CHART NOTE - NSCHARTNOTEFT_GEN_A_CORE
Patient was clear for discharge today.   Patient left hospital prior to receiving discharge paperwork or medications from [facility] prior to last ancef dose for today. IV found in garbage by RN. Nursing staff attempted to locate pt unsuccessfully.      Patient is A&O x3 and has full capacity to make his or her own medical decisions.       Discussed with attending, Dr. Josie Benoit PARayoC  Department of Medicine  Pager [phone]

## 2022-08-01 NOTE — PROGRESS NOTE ADULT - PROBLEM SELECTOR PLAN 3
resolved  valdemar PO
DVT: Lovenox  Diet: Regular  Dispo: SW consult as patient is undomiciled
resolved  valdemar PO
resolved  valdemar PO
DVT: Lovenox  Diet: Regular  Dispo: SW consult as patient is undomiciled

## 2022-08-01 NOTE — PROGRESS NOTE ADULT - ASSESSMENT
31 yo M no prior PMH, homeless, MSW, initially with rash  Leukocytosis, no fevers  Rash starting one month ago on ankles, feet, lower arms  Rash started as irritation lesions, then ulcerated and became purulent  CXR clear  No typical risk factors for Monkeypox  Patient is homeless  Uncertain underlying etiology--consider rickettsial such as rickettsialpox vs monkey pox vs other bites/lesions related to insects vs atypicals (appreciate derm input, possible concern for NTM)  Overall,  1) Rash  - Unclear etiology, could be monkeypox but does not seem to have risk factors; consider insect related process in setting of homelessness  - Doxycyline 100mg q 12  - F/U Bartonella, HIV, syphilis, RMSF, BCXs x 2  - F/U Monkeypox swab  - Appreciate Derm input--cultures and studies pending, follow up  2) Leukocytosis  -  Trend to normal  - BCXs as above  3) Parainfluenza  - Supportive care    Zac Cast MD  Contact on TEAMS messaging from 9am - 5pm  From 5pm-9am, on weekends, or if no response call [phone]
33 y/o M with no PMH/PSH, undomiciled presenting with complaint of rash for approximately 1 month on ankles and wrists. Etiology of rash unclear
31 y/o M with no PMH/PSH, undomiciled presenting with complaint of rash for approximately 1 month on ankles and wrists. Etiology of rash unclear
31 yo M no prior PMH, homeless, MSW, initially with rash  Leukocytosis, no fevers  Rash starting one month ago on ankles, feet, lower arms  Rash started as irritation lesions, then ulcerated and became purulent  CXR clear  No typical risk factors for Monkeypox; Monkeypox PCR neg  Patient is homeless  Uncertain underlying etiology--suspect infectious process but unclear underlying, culture with GAS  Culture with strep which is unusual considering presentation  Still leukocytosis  RMSF IgM low level positive--likely false +  Atypical presentation of strep infection?  Overall,  1) Rash  - Unclear etiology, could be monkeypox but does not seem to have risk factors; consider insect related process in setting of homelessness  - Cefazolin 1g q 8, when discharge planning, can send out on Keflex 500mg q 6 to complete 10 days (including cefazolin days)  - Appreciate Derm input--cultures and studies pending, follow up  2) Leukocytosis  - Trend to normal, still elevated  - BCXs as above  3) Parainfluenza  - Supportive care    Follow up in ID clinic in 3-4 weeks, [phone]    Zac Cast MD  Contact on TEAMS messaging from 9am - 5pm  From 5pm-9am, on weekends, or if no response call [phone]
31 y/o M with no PMH/PSH, undomiciled presenting with complaint of rash for approximately 1 month on ankles and wrists. Etiology of rash unclear
31 yo M no prior PMH, homeless, MSW, initially with rash  Leukocytosis, no fevers  Rash starting one month ago on ankles, feet, lower arms  Rash started as irritation lesions, then ulcerated and became purulent  CXR clear  No typical risk factors for Monkeypox  Patient is homeless  Uncertain underlying etiology--suspect infectious process but unclear underlying, culture with GAS  Culture with strep which is unusual considering presentation  Still leukocytosis  Overall,  1) Rash  - Unclear etiology, could be monkeypox but does not seem to have risk factors; consider insect related process in setting of homelessness  - Would change Doxy to Cefazolin 1g q 8 (culture showing strep which would not be well covered by Doxy)  - F/U Bartonella, RMSF, BCXs x 2  - F/U Monkeypox swab  - Appreciate Derm input--cultures and studies pending, follow up  2) Leukocytosis  - Trend to normal, still elevated  - BCXs as above  3) Parainfluenza  - Supportive care    Patient asking for discharge--at this point we still have elevated WBC and unclear diagnosis. Patient also unlikely to follow up closely--ideally would hold for another day to follow up cultures and trend WBC. Defer dispo planning to team.    Zac Cast MD  Contact on TEAMS messaging from 9am - 5pm  From 5pm-9am, on weekends, or if no response call [phone]
31 y/o M with no PMH/PSH, undomiciled presenting with complaint of rash for approximately 1 month on ankles and wrists. Etiology of rash unclear
31 y/o M with no PMH/PSH, undomiciled presenting with complaint of rash for approximately 1 month on ankles and wrists. Etiology of rash unclear

## 2022-08-01 NOTE — DISCHARGE NOTE NURSING/CASE MANAGEMENT/SOCIAL WORK - NSDCFUADDAPPT_GEN_ALL_CORE_FT
Follow up with your primary care physician for further monitoring in 1-2 weeks. Please call to arrange appointment.  Follow up with infectious disease for latent tuberculosis.

## 2022-08-01 NOTE — PROGRESS NOTE ADULT - PROBLEM SELECTOR PLAN 1
multiple lesions on R and L leg  monkey pox, bartonella, syphillis neg. RMSF IgM positive, likely false positive per ID  quant gold +, neg CXR  will need outpt f/u for AFB cx of lesion as poss non MTB cause of lesion as well as to f/u on skin biopsy result  ID recs appreciated: dicontinue cefazolin. discharge patient on keflex Keflex 500mg q 6 to complete 10 days (including cefazolin days)

## 2022-08-02 LAB — SURGICAL PATHOLOGY STUDY: SIGNIFICANT CHANGE UP

## 2022-08-05 NOTE — CHART NOTE - NSCHARTNOTESELECT_GEN_ALL_CORE
Dermatology/Event Note
Event Note
Render Post-Care Instructions In Note?: yes
Post-Care Instructions: I reviewed with the patient in detail post-care instructions. Patient is to wear sunprotection, and avoid picking at any of the treated lesions. Pt may apply Vaseline to crusted or scabbing areas.
Duration Of Freeze Thaw-Cycle (Seconds): 0
Detail Level: Detailed
Consent: The patient's consent was obtained including but not limited to risks of crusting, scabbing, blistering, scarring, darker or lighter pigmentary change, recurrence, incomplete removal and infection.

## 2022-08-05 NOTE — CHART NOTE - NSCHARTNOTEFT_GEN_A_CORE
Unable to contact patient regarding biopsy results - no phone number in chart, reviewed social work notes - no phone number for patient in this documentation. Patient is homeless and was not discharged to a particular shelter. Biopsy with ulcer, inflammation. Bacterial culture with growth of GAS; patient discharged with antibiotics as per ID (keflex to complete 10 day course). Unable to contact patient regarding biopsy results - no phone number in chart, reviewed social work notes - no phone number for patient in this documentation. Patient is homeless and was not discharged to a particular shelter. Biopsy with ulcer, inflammation. Bacterial culture with growth of GAS; patient discharged with antibiotics as per ID (keflex to complete 10 day course).     Discussed with Dr. Kramer.

## 2022-08-27 LAB
CULTURE RESULTS: SIGNIFICANT CHANGE UP
SPECIMEN SOURCE: SIGNIFICANT CHANGE UP

## 2022-09-17 LAB
CULTURE RESULTS: SIGNIFICANT CHANGE UP
SPECIMEN SOURCE: SIGNIFICANT CHANGE UP

## 2023-07-27 NOTE — PATIENT PROFILE ADULT - FUNCTIONAL ASSESSMENT - DAILY ACTIVITY 3.
4 = No assist / stand by assistance

## 2023-12-04 NOTE — PATIENT PROFILE ADULT - FALL HARM RISK - FALLEN IN PAST
12/4/2023       RE: Shayne Shoemaker  [address] [facility] [address]     Dear Colleague,    Thank you for referring your patient, Shayne Shoemaker, to the [facility] [place] at [facility]. Please see a copy of my visit note below.    Virtual Visit Details    Type of service:  Video Visit   Video Start Time: 1:28 PM  Video End Time:1:50 PM  Originating Location (pt. Location): Home    Distant Location (provider location):  On-site  Platform used for Video Visit: [facility]  Transplant Infectious Disease Clinic Note:  Follow Up     Patient:  Shayne Shoemaker, Date of birth 1960, Medical record number [id number]  Date of Visit:  12/04/2023         Assessment and Recommendations:   Recommendations:  Continue treatment for SAMREEN infection  - Azithromycin 500 mg once daily (increased from 500 mg 3x weekly on 5/24)  - Ethambutol 1,600 mg once daily (16.9 mg/kg) (increased from 2,400 mg 3x weekly on 5/24)  - Rifabutin 150 mg once daily (1.5 mg/kg) (daily dosing 5/24, decreased from 300mg to 150mg 7/6/23)  - Arikayce neb once daily (uses in the morning) - started the week of 6/12/23  Plan to obtain AFB sputum culture every 3-4 weeks. Have been able to only obtain one sample thus far. Will attempt to re-arrange follow up with Respiratory therapy to get monthly sputums (planning to arrange through ID clinic)  He is tentatively scheduled for a BAL in 1/2024 which can serve in lieu of sputum sample for that month  With persistent culture positivity, tentative plan to go up on Rifabutin to 300mg/d with close monitoring of cell counts (awaiting confirmation from Transplant Pulm team)  With persistent culture positivity, will need to discuss adding Clofazimine as an extra (5th agent)  Have reached out to Transplant pulm team re: optimization of immunosuppression and optimizing airway  clearance  Plan to repeat chest CT - last one was late August. Will arrange through ID clinic  Patient needs to re-establish care with ophthalmology (he is on Ethambutol)  Needs to re-establish care with ENT (he is on Arikayce)  ID follow up every 2 months with f/up with MTM Pharmacist in the months in-between    Assessment:  63 year old male s/p bilateral lung transplant for IPF on 6/17/18, bilateral anastomotic stenosis s/p bronchs with left current stent placement, who is being evaluated for M.gordonae seen on respiratory cultures    #Tree-in-bud nodularity on chest imaging:  #Pulmonary M.avium infection - treatment failure:  Between 7/2022 - 9/2022, patient presented with worsening cough, wheezing, fatigue and 17% decline in PFTs. Although chest CT did not show new changes, there were persistent tree-in-bud opacities. BAL AFB culture positive for M.avium. It was believed that the M.avium might be contributing to respiratory symptoms, PFT decline and in light of persistent symptoms, culture results and imaging findings that could correspond to infection, treatment initiated. Susceptibility testing reviewed and shows Macrolide and AG susceptibility.   Started on 3 drug regimen - Rifabutin, Ethambutol and Azithromycin M/W/F although he was taking Rifabutin daily for first 6 weeks. Reported improvement of cough and shortness of breath. BAL cultures from 1/6/23 negative  After hospital admission from COVID diagnosis in 2/2023, Chest CT repeated which showed increased tree-in-bud nodules B/L along with new multifocal GGOs, B/L upper lobes. Non-invasive fungal workup negative, repeat bronchoscopy performed 4/6/23. Unfortunately, AFB cultures positive for M.avium complex again (still retained susceptibility to Macrolides however slightly higher MICs)  Persistent culture positivity over 6 months into treatment concerning for treatment failure. Reassuring that Macrolide susceptibility retained.   Switched to daily  administration of 3 NTM meds starting 5/24/23. According to IDSA guidelines, liposomal Amikacin (Arikayce) added 6/2023. After cytopenias, Rifabutin dose reduced to 150mg daily. Now tolerating 4 drug regimen well. Slight improvement in symptoms in the summer, but stable since. Last PFTs show 100 ml improvement. CT from 8/24/23 shows some apical nodularity improvement, rest stable. Unfortunately, sputum culture from 10/24/23 still with positivity (although smears have remained negative)  Plan to optimize immunosuppression, airway clearance and drug regimen while getting repeat imaging and monthly AFB sputum cultures. Will add 5th agent if needed    Other Infectious Disease issues include:  - COVID infection: 2/3/23, Remdesivir 2/3 - 2/5/23. Symptoms persisted, admitted and received 5 day Remdesivir course 2/25 - 3/1/23. 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   - Hx of + serum Histoplasma antigen testing on 4/6/22, although the urine Histoplasma antigen on the same day was negative. At the time, with lack of a clear alternative etiology to explain tree-in-bud nodularity, he was started on Itraconazole. However, he only took the medication for a month (length of original prescription). Latest urine Histo antigen 2 months off treatment was negative, indicating that perhaps his serum test was a false positive and/or not the explanation for the nodules.   - Hx of M. gordonae isolated from his respiratory tract on one occasion in BAL culture from 12/22/2021. Previous BALs have failed to show this organism. Chest CT showed some tree-in-bud nodularity however this had been present for several months if not longer, when this organism was not isolated. M.gordonae is an environmental organism and is generally the least pathogenic of the NTM; when isolated in cultures, it is commonly regarded to be a colonizer. Would not specifically target this organism at  "this time  - Hx of Pseudomonas on respiratory cultures (bronch) from 11/12/21. Was on Michael nebs 28 days on and off for suppression to 4/6/2022.   - Hx of pulmonary aspergillus infection (A. fumigatus grew from BAL cultures 12/2020). At the time, serum Aspergillus GM was negative, beta-d-glucan positive at 292. Treated with 3 months of Voriconazole (therapeutic levels between 1.2 - 2.4).  - Possible CMV infection - CMV VL of 69k on bronch from 12/2021. Previously noted to have GGOs on Chest CT 11/2021 but had resolved by the time a repeat Chest CT was performed in 12/2021. Serum CMV VLs remained negative. Was treated with 6 weeks of Valcyte  - QTc interval: 457 msec 6/2/23  - Pneumocystis prophylaxis: Dapsone  - Serostatus & viral prophylaxis: CMV -/+, EBV -/+  - Immunization status: Influenza and other vaccinations: completed covid vaccine series; flu shot; already received Evusheld  - Gamma globulin status: 781 on 8/8/23  - Isolation status:  Good hand hygiene, standard isolation precautions    I spent over 75 mins on day of encounter between chart review, video visit (22 mins), documentation and care coordination    OSCAR Guthrie  Staff Physician, Infectious Diseases  Pager [phone]        Interval History:   Last seen in ID clinic 8/28/23  Recommended getting AFB sputum cultures - were finally able to get an induced sputum 10/24/23 - turned positive 11/14/23 (3 weeks of incubation), now identified as M.avium complex. Susceptibility testing pending    He remains on 3 oral agents daily along with Arikayce  Renal function overall stable to improved, latest Creat 1.35  LFTs normal when last checked in June 2023    Since last seen, he reports his symptoms have not significantly improved, but have not worsened either. He reports cycles of improvement or worsening with respect to his breathing, feels he \"collects stuff in his airways\" and feels better when he is able to get the same out. SOB not worse, did " mention his PFTs were marginally improved on latest check. Denies fevers, chills, night sweats. No swollen lymph nodes. No headaches. No visual disturbances - no double vision, photophobia, color changes. No hearing loss, balance issues. He has not seen Ophthalmology or ENT in a while.         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 Started on Michael nebs 28 days on and off for suppression  - Presumed pulmonary aspergillus infection - A.fumigatus grew from BAL cultures 12/2020. At the time, serum Aspergillus GM was negative, beta-d-glucan positive at 292. Treated with 3 months of Voriconazole (therapeutic levels between 1.2 - 2.4)  - Possible CMV infection - CMV VL of 69k on bronch from 12/2021. Previously noted to have GGOs on Chest CT 11/2021 but had resolved by the time a repeat Chest CT was performed in 12/2021. Serum CMV VLs remained negative. Was treated with 6 weeks of Valcyte    Patient now being evaluated for M.gordonae seen on respiratory culture (from bronch) on 12/2021. This, according to patient, was a routine bronchoscopy performed, given his history of anastomotic stenoses and stent  Patient reports doing well clinically over the past few months. He denies fevers, chills, rigors, night sweats. Weight and appetite stable. He reports having good days and bad when it comes to his respiratory status and health overall but does not notice a decline in respiratory status. Denies chronic cough, has not required supplemental O2. Continues to exercise for  around 2 hours everyday - on treadmill and exercise bike without issues    History of exposures:  Born and raised and has lived his entire life in [place]. [place] travelled in the [place] the [place] but not within the last 10 years. Only international travel to [place] (and possibly a trip to the [place]). They have cats at home and occasionally care for their daughter's dog. No birds, reptiles or other exotic pets. No history of TB or exposure to the same. No known allergies    Transplants:  2018 (Lung); Postoperative day:  .  Coordinator Miriam Lindquist    Review of Systems:  Remaining systems reviewed and negative    Family History   Problem Relation Age of Onset    Glaucoma Mother     Diabetes Mother     Heart Disease Father     Prostate Cancer Maternal Grandfather     Skin Cancer Paternal Grandfather        Social History     Social History Narrative    Lives with wife Roberto. Three children (23-26 years of age). One dog & 3 cats. A daughter who lives with them has 2 cats and a dog. Visited the [facility] several years ago. No travel outside of the country other than a [place] cruise 18 years ago.     Social History     Tobacco Use    Smoking status: Former     Packs/day: 1.00     Years: 38.00     Additional pack years: 0.00     Total pack years: 38.00     Types: Cigarettes     Quit date: 2017     Years since quittin.0    Smokeless tobacco: Never   Vaping Use    Vaping Use: Never used   Substance Use Topics    Alcohol use: No     Comment: not since transplant    Drug use: No       Immunization History   Administered Date(s) Administered    COVID-19 12+ () (MODERNA) 10/12/2023    COVID-19 Monovalent 18+ (Moderna) 2021, 2021, 2021, 2022    Flu, Unspecified 2020    Influenza (IIV3) PF 2006, 10/24/2013    Influenza Vaccine 18-64 (Flublok) 10/22/2019, 2020, 10/27/2021, 10/27/2022    Influenza Vaccine >6 months,quad, PF 10/24/2017,  10/10/2018    Pneumo Conj 13-V (2010&after) 01/25/2018    Pneumococcal 23 valent 05/28/2019    TDAP (Adacel,Boostrix) 05/03/2022    Tdap (Adult) Unspecified Formulation 02/01/2012    Twinrix A/B 01/25/2018, 05/03/2018    Zoster recombinant adjuvanted (SHINGRIX) 05/28/2019, 10/22/2019       No Known Allergies           Physical Exam:     Wt Readings from Last 4 Encounters:   10/24/23 102.1 kg (225 lb)   07/06/23 97.6 kg (215 lb 2.7 oz)   06/21/23 97.8 kg (215 lb 9.6 oz)   04/06/23 94.5 kg (208 lb 5.4 oz)     Exam: Limited exam as visit was conducted via [employer]  GENERAL: well-developed, well-nourished, alert, oriented, in no acute distress.  HEAD: Head is normocephalic, atraumatic   EYES: Eyes have anicteric sclerae.    LUNGS: On room air, no use of accessory muscles  NEUROLOGIC: AAO x 3         Laboratory Data:     Inflammatory Markers    Recent Labs   Lab Test 02/09/18  1221   SED 19   CRP 27.2*       Immune Globulin Studies     Recent Labs   Lab Test 08/08/23  1043 02/25/23  1707 08/09/22  1243 07/07/22  0839 01/15/21  0812 06/16/18  1308 04/30/18  0856 02/09/18  1221    571* 627 531* 675 1,170 1,130 964   IGM  --  60  --   --   --   --  123  --    IGE  --  6  --   --   --   --  82  --    IGA  --  144  --   --   --   --  513*  --    IGG1  --   --   --   --   --   --  456 390   IGG2  --   --   --   --   --   --  415 424   IGG3  --   --   --   --   --   --  326* 197*   IGG4  --   --   --   --   --   --  30 21       Metabolic Studies    Recent Labs   Lab Test 11/29/23  0906 10/24/23  1030 09/05/23  0941 03/20/23  0919 03/14/23  1241 03/10/23  0845 03/03/23  0622 03/02/23  1432 02/26/23  1610 02/26/23  0701    142 142   < > 140  --    < >  --    < > 141   POTASSIUM 4.3 4.3 4.4   < > 4.2  --    < >  --    < > 3.6   CHLORIDE 106 107 108*   < > 103 107   < >  --    < > 109*   CO2 24 23 22   < > 24  --    < >  --    < > 17*   ANIONGAP 11 12 12   < > 13  --    < >  --    < > 15   BUN 21.4 26.1* 20.8   < > 23.8*   --    < >  --    < > 13.5   CR 1.35* 1.71* 1.54*   < > 1.25*  --    < >  --    < > 1.44*   [id number]  --   --   --   --   --  1.23  --   --   --   --    GFRESTIMATED 60* 45* 51*   < > 66  --    < >  --    < > 56*   GLC 82 88 82   < > 93  --    < >  --    < > 94   LACIE 9.8 9.3 9.0   < > 9.6  --    < >  --    < > 7.7*   PHOS  --   --   --   --  2.9  --    < >  --    < > 1.7*   MAG 1.9 1.9 1.9   < > 2.2  --    < >  --    < > 1.5*   LACT  --   --   --   --   --   --   --  1.4  --   --    CKT  --   --   --   --   --   --   --   --   --  83    < > = values in this interval not displayed.       Hepatic Studies    Recent Labs   Lab Test 07/03/23  0908 06/21/23  0757 03/14/23  1241   BILITOTAL 0.4 0.4 0.3   DBIL <0.20 <0.20 <0.20   ALKPHOS 56 59 67   PROTTOTAL 7.0 6.9 7.4   ALBUMIN 4.4 4.3 4.3   AST 31 25 30   ALT 18 17 38       Hematology Studies   Recent Labs   Lab Test 11/29/23  0906 10/24/23  1030 09/05/23  0941 08/24/23  0839 08/08/23  1043 08/02/23  0914 07/14/23  0922 03/14/23  1241 03/10/23  0845 05/09/21  0923 05/02/21  1057 04/21/21  0810   WBC 5.9 6.8 5.3 6.3   < > 6.1 5.3   < >  --    < > 4.4 3.6*   31262  --   --   --   --   --   --   --   --  5.4   < >  --   --    ANEU  --   --   --   --   --   --   --   --   --   --  2.5 1.7   ANEUTAUTO  --   --   --   --   --  3.9 3.3   < >  --    < >  --   --    ALYM  --   --   --   --   --   --   --   --   --   --  1.1 1.0   ALYMPAUTO  --   --   --   --   --  1.4 1.1   < >  --    < >  --   --    EVA  --   --   --   --   --   --   --   --   --   --  0.7 0.8   AMONOAUTO  --   --   --   --   --  0.7 0.7   < >  --    < >  --   --    AEOS  --   --   --   --   --   --   --   --   --   --  0.1 0.1   AEOSAUTO  --   --   --   --   --  0.1 0.1   < >  --    < >  --   --    ABSBASO  --   --   --   --   --  0.0 0.0   < >  --    < >  --   --    HGB 13.3 12.4* 12.1* 13.3   < > 12.5* 12.5*   < >  --    < > 12.5* 13.1*   [id number]  --   --   --   --   --   --   --   --  12.1*   < >  --   --    HCT  40.8 37.8* 37.6* 41.1   < > 38.9* 37.8*   < >  --    < > 38.2* 40.0   * 144* 155 160   < > 177 150   < >  --    < > 145* 160   74081  --   --   --   --   --   --   --   --  167   < >  --   --     < > = values in this interval not displayed.     Medication levels    Recent Labs   Lab Test 12/01/23  1106 01/27/21  0901 01/15/21  0812 06/20/18  0402 06/19/18  0338   VANCOMYCIN  --   --   --   --  16.0   VCON  --   --  2.4   < >  --    TACROL 12.3   < > 13.0   < > 21.8*    < > = values in this interval not displayed.     Microbiology:  Last Culture results with specimen source  Culture   Date Value Ref Range Status   04/06/2023 2+ Actinomyces odontolyticus (A)  Final     Comment:     Susceptibilities not routinely done, refer to antibiogram to view typical susceptibility profilesThis organism is part of normal jim, but on occasion may be a true pathogen. Clinical correlation must be applied to interpreting this result.   04/06/2023 4+ Normal jim  Final   04/06/2023 Aspergillus nidulans (A)  Final   04/06/2023 Penicillium species (A)  Final   04/06/2023 3+ Normal jim  Final   02/25/2023 3+ Normal jim  Final   02/25/2023   Final    >10 Squamous epithelial cells/low power field indicates oral contamination. Please recollect.   02/25/2023 No Growth  Final   02/24/2023 No Growth  Final   02/24/2023 No Growth  Final   01/06/2023 No Actinomyces isolated  Final   01/06/2023 No Growth  Final   01/06/2023 No Growth  Final   01/06/2023 4+ Normal jim  Final   08/19/2022 3+ Actinomyces odontolyticus (A)  Final     Comment:     This organism is part of normal jim, but on occasion may be a true pathogen. Clinical correlation must be applied to interpreting this result.  Susceptibilities not routinely done   08/19/2022 3+ Normal jim  Final   08/19/2022 No Growth  Final   08/19/2022 No Growth  Final   08/19/2022 2+ Normal jim  Final   12/22/2021 No Growth  Final   12/22/2021 No Growth  Final   12/22/2021 2+ Normal  jim  Final     Culture Micro   Date Value Ref Range Status   02/04/2021   Final    No Actinomyces species isolated  Since this specimen was not transported in the proper anaerobic transport media, the   absence of anaerobes in this culture does not rule out the presence of anaerobes in this   specimen.     02/04/2021 Culture negative for acid fast bacilli  Final   02/04/2021   Final    Assayed at [facility] [address] [facility] [place] [address] [phone]   02/04/2021 Culture negative after 4 weeks  Final   02/04/2021 No growth after 4 weeks  Final   02/04/2021 (A)  Final    Light growth  Staphylococcus epidermidis  Susceptibility testing not routinely done     12/30/2020 Culture negative for acid fast bacilli  Final   12/30/2020   Final    Assayed at [facility] [address] [facility] [place] [address] [phone]   12/30/2020 Aspergillus fumigatus  isolated   (A)  Final   12/30/2020   Final    No additional fungus isolated after 6 days incubation   12/30/2020 Unable to hold 4 weeks due to overgrowth of fungus  Final   12/30/2020 Light growth  Normal respiratory jim    Final   12/30/2020 Light growth  Aspergillus fumigatus   (A)  Final         Fungal testing  Recent Labs   Lab Test 04/06/23  0742 03/24/23  0950 02/28/23  1458 02/25/23  1707 08/09/22  1243 04/06/22  1021 12/30/20  2226   FGTL  --  47  --  40 <31  --  292   FGTLI  --  Negative  --  Negative Negative  --  Positive*   ASPGAI 0.24 0.09  --  0.07 0.03 0.04 0.05   ASPAG Negative  --   --   --   --   --   --    ASPGAA  --  Negative  --  Negative Negative Negative Negative   COFUNG  --   --  <1:2  --   --   --   --    FUNBL  --   --  0.9  --   --   --   --        Beta D Glucan levels (Fungitell assay)    (1,3)-Beta-D-Glucan   Date Value Ref Range Status   03/24/2023 47 pg/mL Final   02/25/2023 40 pg/mL Final   08/09/2022 <31 pg/mL Final   12/30/2020 292 pg/mL Final     B-D GLUCAN INTERPRETATION (1,3)   Date Value Ref Range Status    03/24/2023 Negative Negative Final     Comment:     INTERPRETIVE INFORMATION: (1,3)-beta-D-glucan (Fungitell)      Less than 31 pg/mL ................... Negative    31-59 pg/mL .......................... Negative    60-79 pg/mL .......................... Indeterminate    Greater than or equal to 80 pg/mL .... Positive    The Fungitell test is indicated for presumptive diagnosis   of fungal infection and should be used in conjunction with   other diagnostic procedures. This test does not detect   certain fungal species such as Cryptococcus, which produce   very low levels of (1,3)-beta-D-glucan. This test will not   detect the zygomycetes, such as Absidia, Mucor, and   Rhizopus, which are not known to produce   (1,3)-beta-D-glucan. In addition, the yeast phase of   Blastomyces dermatitidis produces little   (1,3)-beta-D-glucan and may not be detected by the assay.  Performed By: [facility]  [address] [place] [id number]  : Evens Yarbrough MD, PhD   02/25/2023 Negative Negative Final     Comment:     INTERPRETIVE INFORMATION: (1,3)-beta-D-glucan (Fungitell)      Less than 31 pg/mL ................... Negative    31-59 pg/mL .......................... Negative    60-79 pg/mL .......................... Indeterminate    Greater than or equal to 80 pg/mL .... Positive    The Fungitell test is indicated for presumptive diagnosis   of fungal infection and should be used in conjunction with   other diagnostic procedures. This test does not detect   certain fungal species such as Cryptococcus, which produce   very low levels of (1,3)-beta-D-glucan. This test will not   detect the zygomycetes, such as Absidia, Mucor, and   Rhizopus, which are not known to produce   (1,3)-beta-D-glucan. In addition, the yeast phase of   Blastomyces dermatitidis produces little   (1,3)-beta-D-glucan and may not be detected by the assay.  Performed By: [facility]  [address] [facility]  [place] [id number]  : Evens Yarbrough MD, PhD   08/09/2022 Negative Negative Final     Comment:     INTERPRETIVE INFORMATION: (1,3)-beta-D-glucan (Fungitell)      Less than 31 pg/mL ................... Negative    31-59 pg/mL .......................... Negative    60-79 pg/mL .......................... Indeterminate    Greater than or equal to 80 pg/mL .... Positive    The Fungitell test is indicated for presumptive diagnosis   of fungal infection and should be used in conjunction with   other diagnostic procedures. This test does not detect   certain fungal species such as Cryptococcus, which produce   very low levels of (1,3)-beta-D-glucan. This test will not   detect the zygomycetes, such as Absidia, Mucor, and   Rhizopus, which are not known to produce   (1,3)-beta-D-glucan. In addition, the yeast phase of   Blastomyces dermatitidis produces little   (1,3)-beta-D-glucan and may not be detected by the assay.  Performed By: [facility]  [address] [place] [address]  : Evens Yarbrough MD, PhD   12/30/2020 Positive (A) Negative    Final     Comment:     (Note)  INTERPRETIVE INFORMATION: (1,3)-beta-D-glucan (Fungitell)   Less than 31 pg/mL ................... Negative   31-59 pg/mL .......................... Negative   60-79 pg/mL .......................... Indeterminate   Greater than or equal to 80 pg/mL .... Positive  The Fungitell test is indicated for presumptive diagnosis   of fungal infection and should be used in conjunction with   other diagnostic procedures. This test does not detect   certain fungal species such as Cryptococcus, which produce   very low levels of (1,3)-beta-D-glucan. This test will not   detect the zygomycetes, such as Absidia, Mucor, and   Rhizopus, which are not known to produce   (1,3)-beta-D-glucan. In addition, the yeast phase of   Blastomyces dermatitidis produces little   (1,3)-beta-D-glucan and may not be detected by the  assay.  Performed By: [facility]  [address] [place] [address]  : Beata Stoddard MD        Virology:  Coronavirus-19 testing    Recent Labs   Lab Test 09/12/22  0817 02/01/21  1110 11/20/20  1326 11/07/20  1330 10/26/20  0706 10/12/20  1024   [id number]  --  Nasopharyngeal  --   --   --   --    [id number]  --  Nasopharyngeal Nasopharyngeal Nasopharyngeal Nasopharyngeal  --    COVIDPCREXT Negative  --   --   --   --  Undetected       Respiratory virus (non-coronavirus-19) testing    Recent Labs   Lab Test 02/25/23  0009 12/22/21  0816 12/22/21  0816 02/04/21  0752   RVSPEC  --   --   --  Bronchial   IFLUA Not Detected   < > Negative Negative   INFZA Negative  --   --   --    FLUAH1 Not Detected   < > Negative Negative   [id number] Not Detected   < > Negative Negative   FLUAH3 Not Detected   < > Negative Negative   IFLUB Not Detected   < > Negative Negative   INFZB Negative  --   --   --    PIV1 Not Detected  --  Negative Negative   PIV2 Not Detected  --  Negative Negative   PIV3 Not Detected  --  Negative Negative   PIV4 Not Detected  --   --   --    IRSV Negative  --   --   --    HRVS  --   --  Negative Negative   RSVA Not Detected   < > Negative Negative   RSVB Not Detected   < > Negative Negative   HMPV Not Detected  --  Negative Negative   ADVBE  --   --  Negative Negative   ADVC  --   --  Negative Negative   ADENOV Not Detected  --   --   --    CORONA Not Detected  --   --   --     < > = values in this interval not displayed.       CMV viral loads    Recent Labs   Lab Test 04/06/23  0742 12/22/21  0816 05/09/21  0923 05/02/21  1057 03/31/21  1152 02/14/21  1023 02/04/21  0752 01/21/20  1048 11/12/19  0944 10/31/19  0937 03/27/19  1219 03/08/19  0911 01/24/19  1039 01/21/19  0830 01/15/19  0613 12/10/18  0937   CSPEC  --   --  EDTA PLASMA EDTA PLASMA Plasma Blood Bronchial lavage   < >  --   --    < >  --    < >  --    < >  --    CMVRESINST 404* 69,109*  --   --   --   --    --   --   --   --   --   --   --   --   --   --    CMVLOG 2.6 4.8 Not Calculated Not Calculated Not Calculated Not Calculated 3.4*   < >  --   --    < >  --    < >  --    < >  --    60255  --   --   --   --   --   --   --   --  Negative Negative  --  Undetected  --  Undetected  --  Undetected    < > = values in this interval not displayed.       CMV viral loads    CMV DNA IU/mL, Instrument   Date Value Ref Range Status   04/06/2023 404 (H) <1 IU/mL Final   12/22/2021 69,109 (H) <1 IU/mL Final     Log IU/mL of CMVQNT   Date Value Ref Range Status   05/09/2021 Not Calculated <2.1 [Log_IU]/mL Final   05/02/2021 Not Calculated <2.1 [Log_IU]/mL Final   03/31/2021 Not Calculated <2.1 [Log_IU]/mL Final   02/14/2021 Not Calculated <2.1 [Log_IU]/mL Final   02/04/2021 3.4 (H) <2.1 [Log_IU]/mL Final   01/27/2021 Not Calculated <2.1 [Log_IU]/mL Final   12/29/2020 Not Calculated <2.1 [Log_IU]/mL Final   11/18/2020 Not Calculated <2.1 [Log_IU]/mL Final   10/29/2020 Not Calculated <2.1 [Log_IU]/mL Final   10/26/2020 Not Calculated <2.1 [Log_IU]/mL Final   07/15/2020 Not Calculated <2.1 [Log_IU]/mL Final   04/21/2020 Not Calculated <2.1 [Log_IU]/mL Final   01/21/2020 Not Calculated <2.1 [Log_IU]/mL Final   10/22/2019 Not Calculated <2.1 [Log_IU]/mL Final   07/23/2019 Not Calculated <2.1 [Log_IU]/mL Final   05/29/2019 Not Calculated <2.1 [Log_IU]/mL Final   05/28/2019 Not Calculated <2.1 [Log_IU]/mL Final   03/28/2019 Not Calculated <2.1 [Log_IU]/mL Final   03/27/2019 Not Calculated <2.1 [Log_IU]/mL Final   02/26/2019 Not Calculated <2.1 [Log_IU]/mL Final   02/12/2019 Not Calculated <2.1 [Log_IU]/mL Final   01/24/2019 Not Calculated <2.1 [Log_IU]/mL Final   01/15/2019 Not Calculated <2.1 [Log_IU]/mL Final   12/04/2018 Not Calculated <2.1 [Log_IU]/mL Final   11/15/2018 Not Calculated <2.1 [Log_IU]/mL Final   11/15/2018 Not Calculated <2.1 [Log_IU]/mL Final   11/06/2018 Not Calculated <2.1 [Log_IU]/mL Final   10/02/2018 Not Calculated  <2.1 [Log_IU]/mL Final   09/12/2018 Not Calculated <2.1 [Log_IU]/mL Final   09/11/2018 Not Calculated <2.1 [Log_IU]/mL Final     CMV log   Date Value Ref Range Status   04/06/2023 2.6  Final   12/22/2021 4.8  Final     CMV DNA Quant (External)   Date Value Ref Range Status   11/12/2019 Negative Negative IU/mL Final   10/31/2019 Negative Negative IU/mL Final   03/08/2019 Undetected Undetected IU/mL Final   01/21/2019 Undetected Undetected IU/mL Final   12/10/2018 Undetected Undetected IU/mL Final       EBV DNA Copies/mL   Date Value Ref Range Status   11/29/2023 Not Detected Not Detected copies/mL Final   08/08/2023 Not Detected Not Detected copies/mL Final   06/12/2023 Not Detected Not Detected copies/mL Final   06/01/2023 Not Detected Not Detected copies/mL Final   02/16/2023 Not Detected Not Detected copies/mL Final   01/04/2023 Not Detected Not Detected copies/mL Final   07/07/2022 Not Detected Not Detected copies/mL Final   10/26/2020 EBV DNA Not Detected EBVNEG^EBV DNA Not Detected [Copies]/mL Final     Hepatitis B Testing     Recent Labs   Lab Test 06/16/18  1308 04/30/18  0856   AUSAB 0.00 0.55   HBCAB Nonreactive Nonreactive   HEPBANG Nonreactive Nonreactive   HBQRES HBV DNA Not Detected  --        Hepatitis C Antibody   Date Value Ref Range Status   04/30/2018 Nonreactive NR^Nonreactive Final     Comment:     Assay performance characteristics have not been established for newborns,   infants, and children         CMV Antibody IgG   Date Value Ref Range Status   06/16/2018 >8.0 (H) 0.0 - 0.8 AI Final     Comment:     Positive  Antibody index (AI) values reflect qualitative changes in antibody   concentration that cannot be directly associated with clinical condition or   disease state.     04/30/2018 >8.0 (H) 0.0 - 0.8 AI Final     Comment:     Positive  Antibody index (AI) values reflect qualitative changes in antibody   concentration that cannot be directly associated with clinical condition or   disease  state.       Varicella Zoster Virus Antibody IgG   Date Value Ref Range Status   04/30/2018 3.6 (H) 0.0 - 0.8 AI Final     Comment:     Positive, suggests prev. exposure and probable immunity  Antibody index (AI) values reflect qualitative changes in antibody   concentration that cannot be directly associated with clinical condition or   disease state.       EBV Capsid Antibody IgG   Date Value Ref Range Status   06/16/2018 >8.0 (H) 0.0 - 0.8 AI Final     Comment:     Positive, suggests recent or past exposure  Antibody index (AI) values reflect qualitative changes in antibody   concentration that cannot be directly associated with clinical condition or   disease state.     04/30/2018 7.5 (H) 0.0 - 0.8 AI Final     Comment:     Positive, suggests recent or past exposure  Antibody index (AI) values reflect qualitative changes in antibody   concentration that cannot be directly associated with clinical condition or   disease state.       Toxoplasma Antibody IgG   Date Value Ref Range Status   04/30/2018 47.9 (H) 0.0 - 7.1 IU/mL Final     Comment:     Positive-Presence of detectable Toxoplasma gondii IgG antivodies. A positive   result generally indicates either recent or past exposure to the pathogen.  The magnitude of the measured result is not indicative of the amount of   antibody present. The concentrations of anti-Toxoplasma gondii IgG in a given   specimen determined with assays from different manufacturers can vary due to   differences in assay methods and reagent specificity.       Herpes Simplex Virus Type 1 IgG   Date Value Ref Range Status   06/16/2018 1.2 (H) 0.0 - 0.8 AI Final     Comment:     Positive.  IgG antibody to HSV-1 detected.  Antibody index (AI) values reflect qualitative changes in antibody   concentration that cannot be directly associated with clinical condition or   disease state.     04/30/2018 1.0 (H) 0.0 - 0.8 AI Final     Comment:     Equivocal, please recollect.  Antibody index (AI)  values reflect qualitative changes in antibody   concentration that cannot be directly associated with clinical condition or   disease state.       Herpes Simplex Virus Type 2 IgG   Date Value Ref Range Status   06/16/2018 <0.2 0.0 - 0.8 AI Final     Comment:     No HSV-2 IgG antibodies detected.  Antibody index (AI) values reflect qualitative changes in antibody   concentration that cannot be directly associated with clinical condition or   disease state.     04/30/2018 <0.2 0.0 - 0.8 AI Final     Comment:     No HSV-2 IgG antibodies detected.  Antibody index (AI) values reflect qualitative changes in antibody   concentration that cannot be directly associated with clinical condition or   disease state.       Imaging:  CT Chest 8/24/23:  IMPRESSION:   1. Previously seen 4 mm pleural-based nodular opacity has decreased in size, likely representing atelectasis. Decreased patchy nodularity in the left upper lobe as well. This may represented previous inflammatory/infectious process which has moderately improved.  2. Other scattered persistent tree-in-bud nodularity is grossly similar to prior.    CT Chest w/o contrast 3/20/23:  IMPRESSION:   1. Increased tree-in-bud nodules scattered throughout both lungs. Additionally there are new multifocal areas of groundglass nodular opacities, predominantly in the bilateral upper lobes. Findings may represent infectious versus inflammatory etiology. Recommend short-term follow-up CT in 3 months.  2. Slightly increased right paratracheal lymph node compared to 8/9/2022, likely reactive.   3. Stable postsurgical changes of bilateral lung transplantation.    CXR 2/27/23:  Impression:   Stable chest with slightly decreased perihilar bibasilar streaky opacities, may represent improved inspiratory volume with benign vascular crowding versus atelectasis/edema or infection. Recommend follow-up to resolution.    CT A/P with contrast 2/21/23:  IMPRESSION:   1. No acute pathology  visualized within the abdomen or pelvis.  2. Multifocal groundglass micronodular and tree-in-bud type pulmonary opacities at the lung bases suspicious for an acute infectious/inflammatory process.       CT Chest w/o contrast 8/9/22:  IMPRESSION: Scattered tree-in-bud nodules greatest in both lower lobes are not significantly changed. No new areas of consolidation or Fibrosis.    CT Chest w/o contrast 3/1/22:  Impression:   1. Interval resolution of groundglass opacities in the bilateral lower lobes.  2. Similar appearance of tree-in-bud nodularity within the right upper lobe and lower lobes and left lung base with new cluster laterally in the left lower lobe which may indicate chronic infectious etiology.  3. Hepatic steatosis.    Morro Orourke MD     No